# Patient Record
Sex: FEMALE | Race: WHITE | NOT HISPANIC OR LATINO | URBAN - METROPOLITAN AREA
[De-identification: names, ages, dates, MRNs, and addresses within clinical notes are randomized per-mention and may not be internally consistent; named-entity substitution may affect disease eponyms.]

---

## 2018-02-14 ENCOUNTER — OFFICE VISIT (OUTPATIENT)
Dept: INTERNAL MEDICINE | Facility: CLINIC | Age: 38
End: 2018-02-14

## 2018-02-14 VITALS
WEIGHT: 137 LBS | SYSTOLIC BLOOD PRESSURE: 120 MMHG | DIASTOLIC BLOOD PRESSURE: 84 MMHG | OXYGEN SATURATION: 98 % | BODY MASS INDEX: 22.82 KG/M2 | HEART RATE: 85 BPM | HEIGHT: 65 IN

## 2018-02-14 DIAGNOSIS — J10.1 INFLUENZA A: Primary | ICD-10-CM

## 2018-02-14 DIAGNOSIS — J02.9 SORE THROAT: ICD-10-CM

## 2018-02-14 LAB
EXPIRATION DATE: NORMAL
EXPIRATION DATE: NORMAL
FLUAV AG NPH QL: NORMAL
FLUBV AG NPH QL: NORMAL
INTERNAL CONTROL: NORMAL
INTERNAL CONTROL: NORMAL
Lab: NORMAL
Lab: NORMAL
S PYO AG THROAT QL: NEGATIVE

## 2018-02-14 PROCEDURE — 87880 STREP A ASSAY W/OPTIC: CPT | Performed by: INTERNAL MEDICINE

## 2018-02-14 PROCEDURE — 99213 OFFICE O/P EST LOW 20 MIN: CPT | Performed by: INTERNAL MEDICINE

## 2018-02-14 PROCEDURE — 87804 INFLUENZA ASSAY W/OPTIC: CPT | Performed by: INTERNAL MEDICINE

## 2018-02-14 RX ORDER — OSELTAMIVIR PHOSPHATE 75 MG/1
75 CAPSULE ORAL 2 TIMES DAILY
Qty: 10 CAPSULE | Refills: 0 | Status: SHIPPED | OUTPATIENT
Start: 2018-02-14 | End: 2018-07-02

## 2018-02-14 NOTE — PROGRESS NOTES
"Subjective     Salina Yee is a 38 y.o. female who presents with   Chief Complaint   Patient presents with   • Sore Throat   • Cough       History of Present Illness     Sore throat for the past 2 days.  Severe sore throat.  Mild cold.  Some head and nasal congestion.  No fever.  No achiness.      Review of Systems   Respiratory: Positive for cough. Negative for shortness of breath.    Cardiovascular: Negative for chest pain.       The following portions of the patient's history were reviewed and updated as appropriate: allergies, current medications and problem list.    Patient Active Problem List    Diagnosis Date Noted   • HLD (hyperlipidemia) 05/25/2016       Current Outpatient Prescriptions on File Prior to Visit   Medication Sig Dispense Refill   • TRI-LO-SPRINTEC 0.18/0.215/0.25 MG-25 MCG per tablet        No current facility-administered medications on file prior to visit.        Objective     /84  Pulse 85  Ht 165.1 cm (65\")  Wt 62.1 kg (137 lb)  SpO2 98%  BMI 22.8 kg/m2    Physical Exam   Constitutional: She is oriented to person, place, and time. She appears well-developed and well-nourished.   HENT:   Head: Normocephalic and atraumatic.   Right Ear: Hearing and tympanic membrane normal.   Left Ear: Hearing and tympanic membrane normal.   Mouth/Throat: No oropharyngeal exudate or posterior oropharyngeal erythema.   Cardiovascular: Normal rate, regular rhythm and normal heart sounds.    Pulmonary/Chest: Effort normal and breath sounds normal.   Neurological: She is alert and oriented to person, place, and time.   Skin: Skin is warm and dry.   Psychiatric: She has a normal mood and affect. Her behavior is normal.       Assessment/Plan   Salina was seen today for sore throat and cough.    Diagnoses and all orders for this visit:    Influenza A    Sore throat  -     POC Rapid Strep A  -     Throat / Upper Respiratory Culture - Swab, Throat  -     POC Influenza A / B    Other orders  -     " oseltamivir (TAMIFLU) 75 MG capsule; Take 1 capsule by mouth 2 (Two) Times a Day.        Discussion  Patient presents with an acute respiratory illness c/w the flu.  [facility].  A prescription for Tamiflu is provided.  Increase rest and fluids.  Warned of pneumonia complication.  The patient will let me know if not feeling better over the next several days.             No future appointments.

## 2018-06-26 DIAGNOSIS — Z00.00 HEALTH CARE MAINTENANCE: Primary | ICD-10-CM

## 2018-06-30 LAB
25(OH)D3+25(OH)D2 SERPL-MCNC: 56.3 NG/ML (ref 30–100)
ALBUMIN SERPL-MCNC: 4.3 G/DL (ref 3.5–5.2)
ALBUMIN/GLOB SERPL: 1.7 G/DL
ALP SERPL-CCNC: 48 U/L (ref 39–117)
ALT SERPL-CCNC: 23 U/L (ref 1–33)
APPEARANCE UR: CLEAR
AST SERPL-CCNC: 21 U/L (ref 1–32)
BACTERIA #/AREA URNS HPF: ABNORMAL /HPF
BASOPHILS # BLD AUTO: 0.04 10*3/MM3 (ref 0–0.2)
BASOPHILS NFR BLD AUTO: 0.4 % (ref 0–1.5)
BILIRUB SERPL-MCNC: 0.3 MG/DL (ref 0.1–1.2)
BILIRUB UR QL STRIP: NEGATIVE
BUN SERPL-MCNC: 14 MG/DL (ref 6–20)
BUN/CREAT SERPL: 16.9 (ref 7–25)
CALCIUM SERPL-MCNC: 9.1 MG/DL (ref 8.6–10.5)
CHLORIDE SERPL-SCNC: 103 MMOL/L (ref 98–107)
CHOLEST SERPL-MCNC: 205 MG/DL (ref 0–200)
CO2 SERPL-SCNC: 23.7 MMOL/L (ref 22–29)
COLOR UR: YELLOW
CREAT SERPL-MCNC: 0.83 MG/DL (ref 0.57–1)
EOSINOPHIL # BLD AUTO: 0.2 10*3/MM3 (ref 0–0.7)
EOSINOPHIL NFR BLD AUTO: 1.8 % (ref 0.3–6.2)
EPI CELLS #/AREA URNS HPF: ABNORMAL /HPF
ERYTHROCYTE [DISTWIDTH] IN BLOOD BY AUTOMATED COUNT: 13 % (ref 11.7–13)
GLOBULIN SER CALC-MCNC: 2.5 GM/DL
GLUCOSE SERPL-MCNC: 93 MG/DL (ref 65–99)
GLUCOSE UR QL: NEGATIVE
HCT VFR BLD AUTO: 40.4 % (ref 35.6–45.5)
HDLC SERPL-MCNC: 79 MG/DL (ref 40–60)
HGB BLD-MCNC: 12.9 G/DL (ref 11.9–15.5)
HGB UR QL STRIP: ABNORMAL
IMM GRANULOCYTES # BLD: 0.03 10*3/MM3 (ref 0–0.03)
IMM GRANULOCYTES NFR BLD: 0.3 % (ref 0–0.5)
KETONES UR QL STRIP: NEGATIVE
LDLC SERPL CALC-MCNC: 102 MG/DL (ref 0–100)
LEUKOCYTE ESTERASE UR QL STRIP: NEGATIVE
LYMPHOCYTES # BLD AUTO: 2.29 10*3/MM3 (ref 0.9–4.8)
LYMPHOCYTES NFR BLD AUTO: 20.6 % (ref 19.6–45.3)
MCH RBC QN AUTO: 31.2 PG (ref 26.9–32)
MCHC RBC AUTO-ENTMCNC: 31.9 G/DL (ref 32.4–36.3)
MCV RBC AUTO: 97.8 FL (ref 80.5–98.2)
MICRO URNS: ABNORMAL
MONOCYTES # BLD AUTO: 0.58 10*3/MM3 (ref 0.2–1.2)
MONOCYTES NFR BLD AUTO: 5.2 % (ref 5–12)
MUCOUS THREADS URNS QL MICRO: PRESENT /HPF
NEUTROPHILS # BLD AUTO: 7.95 10*3/MM3 (ref 1.9–8.1)
NEUTROPHILS NFR BLD AUTO: 71.7 % (ref 42.7–76)
NITRITE UR QL STRIP: NEGATIVE
PH UR STRIP: 5.5 [PH] (ref 5–7.5)
PLATELET # BLD AUTO: 305 10*3/MM3 (ref 140–500)
POTASSIUM SERPL-SCNC: 4.5 MMOL/L (ref 3.5–5.2)
PROT SERPL-MCNC: 6.8 G/DL (ref 6–8.5)
PROT UR QL STRIP: NEGATIVE
RBC # BLD AUTO: 4.13 10*6/MM3 (ref 3.9–5.2)
RBC #/AREA URNS HPF: ABNORMAL /HPF
SODIUM SERPL-SCNC: 141 MMOL/L (ref 136–145)
SP GR UR: 1.01 (ref 1–1.03)
TRIGL SERPL-MCNC: 118 MG/DL (ref 0–150)
TSH SERPL DL<=0.005 MIU/L-ACNC: 1.46 MIU/ML (ref 0.27–4.2)
URINALYSIS REFLEX: ABNORMAL
UROBILINOGEN UR STRIP-MCNC: 0.2 MG/DL (ref 0.2–1)
VLDLC SERPL CALC-MCNC: 23.6 MG/DL (ref 5–40)
WBC # BLD AUTO: 11.09 10*3/MM3 (ref 4.5–10.7)
WBC #/AREA URNS HPF: ABNORMAL /HPF

## 2018-07-02 ENCOUNTER — OFFICE VISIT (OUTPATIENT)
Dept: INTERNAL MEDICINE | Facility: CLINIC | Age: 38
End: 2018-07-02

## 2018-07-02 VITALS
HEIGHT: 65 IN | BODY MASS INDEX: 21.99 KG/M2 | HEART RATE: 80 BPM | WEIGHT: 132 LBS | SYSTOLIC BLOOD PRESSURE: 122 MMHG | DIASTOLIC BLOOD PRESSURE: 70 MMHG | OXYGEN SATURATION: 99 %

## 2018-07-02 DIAGNOSIS — Z00.00 WELL ADULT EXAM: Primary | ICD-10-CM

## 2018-07-02 PROCEDURE — 90632 HEPA VACCINE ADULT IM: CPT | Performed by: INTERNAL MEDICINE

## 2018-07-02 PROCEDURE — 90471 IMMUNIZATION ADMIN: CPT | Performed by: INTERNAL MEDICINE

## 2018-07-02 PROCEDURE — 99395 PREV VISIT EST AGE 18-39: CPT | Performed by: INTERNAL MEDICINE

## 2018-07-02 NOTE — PROGRESS NOTES
Subjective     Salina DANIELS is a 38 y.o. female who presents for a complete physical exam.      History of Present Illness     HLD.  Mild increase in cholesterol but ratios are good.     Review of Systems   Constitutional: Negative.    HENT: Negative.    Eyes: Negative.    Respiratory: Negative.    Cardiovascular: Negative.    Gastrointestinal: Negative.    Endocrine: Negative.    Genitourinary: Negative.    Musculoskeletal: Negative.    Skin: Negative.    Allergic/Immunologic: Negative.    Neurological: Negative.    Hematological: Negative.    Psychiatric/Behavioral: Negative.        The following portions of the patient's history were reviewed and updated as appropriate: allergies, current medications, past family history, past medical history, past social history, past surgical history and problem list.  Health maintenance tab was reviewed and updated with the patient.       Patient Active Problem List    Diagnosis Date Noted   • HLD (hyperlipidemia) 05/25/2016       Past Medical History:   Diagnosis Date   • Hyperlipidemia        Past Surgical History:   Procedure Laterality Date   • CERVICAL BIOPSY  W/ LOOP ELECTRODE EXCISION         Family History   Problem Relation Age of Onset   • Stroke Paternal Grandmother        Social History     Social History   • Marital status: Single     Spouse name: N/A   • Number of children: N/A   • Years of education: N/A     Occupational History   • Not on file.     Social History Main Topics   • Smoking status: Current Every Day Smoker   • Smokeless tobacco: Not on file   • Alcohol use Yes      Comment: Social   • Drug use: Unknown   • Sexual activity: Not on file     Other Topics Concern   • Not on file     Social History Narrative   • No narrative on file       Current Outpatient Prescriptions on File Prior to Visit   Medication Sig Dispense Refill   • TRI-LO-SPRINTEC 0.18/0.215/0.25 MG-25 MCG per tablet      • [DISCONTINUED] oseltamivir (TAMIFLU) 75 MG capsule Take 1  "capsule by mouth 2 (Two) Times a Day. 10 capsule 0     No current facility-administered medications on file prior to visit.        No Known Allergies    Immunization History   Administered Date(s) Administered   • Influenza TIV (IM) 12/08/2014       Objective     /70   Pulse 80   Ht 165.1 cm (65\")   Wt 59.9 kg (132 lb)   SpO2 99%   BMI 21.97 kg/m²     Physical Exam   Constitutional: She is oriented to person, place, and time. She appears well-developed and well-nourished.   HENT:   Head: Normocephalic and atraumatic.   Right Ear: Hearing, tympanic membrane and external ear normal.   Left Ear: Hearing, tympanic membrane and external ear normal.   Nose: Nose normal.   Mouth/Throat: Oropharynx is clear and moist.   Neck: Neck supple. No thyromegaly present.   Cardiovascular: Normal rate, regular rhythm and normal heart sounds.    No murmur heard.  Pulmonary/Chest: Effort normal and breath sounds normal. Right breast exhibits no mass. Left breast exhibits no mass.   Abdominal: Soft. She exhibits no distension. There is no hepatosplenomegaly. There is no tenderness.   Genitourinary: No breast tenderness.   Lymphadenopathy:     She has no cervical adenopathy.   Neurological: She is alert and oriented to person, place, and time.   Skin: Skin is warm and dry.   Psychiatric: She has a normal mood and affect. Her speech is normal and behavior is normal. Judgment and thought content normal. Cognition and memory are normal.       Assessment/Plan   Salina was seen today for annual exam.    Diagnoses and all orders for this visit:    Well adult exam    Other orders  -     Hepatitis A Vaccine Adult IM        Discussion    Patient presents today for a CPE.      Patient follows a healthy diet.   Patient follows an adequate exercise regimen. Mammogram is not indicated. Colonoscopy is not yet indicated.   Pap smears are performed by the patient's gynecologist.   Immunizations are as per orders.  She only smokes 1-2 " cigarettes a week but we did discuss that no amount of tobacco is safe and she is going to quit.      Health Maintenance   Topic Date Due   • ANNUAL PHYSICAL  02/13/1985   • PNEUMOCOCCAL VACCINE (19-64 MEDIUM RISK) (1 of 1 - PPSV23) 02/13/2001   • TDAP/TD VACCINES (1 - Tdap) 02/13/2001   • PAP SMEAR  05/25/2016   • INFLUENZA VACCINE  08/01/2018   • LIPID PANEL  06/29/2019            No future appointments.

## 2018-11-20 ENCOUNTER — APPOINTMENT (OUTPATIENT)
Dept: WOMENS IMAGING | Facility: HOSPITAL | Age: 38
End: 2018-11-20

## 2018-11-20 PROCEDURE — 77065 DX MAMMO INCL CAD UNI: CPT | Performed by: RADIOLOGY

## 2018-11-20 PROCEDURE — G0279 TOMOSYNTHESIS, MAMMO: HCPCS | Performed by: RADIOLOGY

## 2018-11-20 PROCEDURE — 76641 ULTRASOUND BREAST COMPLETE: CPT | Performed by: RADIOLOGY

## 2018-11-20 PROCEDURE — 77061 BREAST TOMOSYNTHESIS UNI: CPT | Performed by: RADIOLOGY

## 2018-11-20 PROCEDURE — MDREVSPNEW: Performed by: RADIOLOGY

## 2019-02-01 ENCOUNTER — OFFICE VISIT (OUTPATIENT)
Dept: MAMMOGRAPHY | Facility: CLINIC | Age: 39
End: 2019-02-01

## 2019-02-01 VITALS
OXYGEN SATURATION: 97 % | WEIGHT: 133 LBS | DIASTOLIC BLOOD PRESSURE: 60 MMHG | TEMPERATURE: 98.2 F | BODY MASS INDEX: 22.16 KG/M2 | HEART RATE: 71 BPM | SYSTOLIC BLOOD PRESSURE: 100 MMHG | HEIGHT: 65 IN

## 2019-02-01 DIAGNOSIS — N63.0 LUMP OR MASS IN BREAST: Primary | ICD-10-CM

## 2019-02-01 PROCEDURE — 99203 OFFICE O/P NEW LOW 30 MIN: CPT | Performed by: SURGERY

## 2019-12-18 ENCOUNTER — OFFICE VISIT (OUTPATIENT)
Dept: INTERNAL MEDICINE | Facility: CLINIC | Age: 39
End: 2019-12-18

## 2019-12-18 VITALS
WEIGHT: 135 LBS | HEIGHT: 65 IN | OXYGEN SATURATION: 98 % | RESPIRATION RATE: 18 BRPM | BODY MASS INDEX: 22.49 KG/M2 | HEART RATE: 86 BPM | TEMPERATURE: 98.2 F | SYSTOLIC BLOOD PRESSURE: 122 MMHG | DIASTOLIC BLOOD PRESSURE: 70 MMHG

## 2019-12-18 DIAGNOSIS — J01.90 ACUTE RHINOSINUSITIS: Primary | ICD-10-CM

## 2019-12-18 PROCEDURE — 99213 OFFICE O/P EST LOW 20 MIN: CPT | Performed by: NURSE PRACTITIONER

## 2019-12-18 RX ORDER — CEFUROXIME AXETIL 250 MG/1
250 TABLET ORAL 2 TIMES DAILY
Qty: 20 TABLET | Refills: 0 | Status: SHIPPED | OUTPATIENT
Start: 2019-12-18 | End: 2023-01-13

## 2019-12-18 RX ORDER — BENZONATATE 100 MG/1
100 CAPSULE ORAL 3 TIMES DAILY PRN
Qty: 30 CAPSULE | Refills: 1 | Status: SHIPPED | OUTPATIENT
Start: 2019-12-18 | End: 2023-01-13

## 2019-12-18 NOTE — PROGRESS NOTES
Subjective   Salina DANIELS is a 39 y.o. female.   Chief Complaint   Patient presents with   • Sinus Pressure   • Cough     Vitals:    12/18/19 1308   BP: 122/70   Pulse: 86   Resp: 18   Temp: 98.2 °F (36.8 °C)   SpO2: 98%     No LMP recorded.    Salina is a patient of Dr philip who is here for an acute visit     Sinus Problem   This is a new problem. The current episode started 1 to 4 weeks ago. The problem has been gradually worsening since onset. There has been no fever. The pain is mild. Associated symptoms include congestion, coughing, ear pain, headaches, sinus pressure and a sore throat. Pertinent negatives include no shortness of breath or sneezing. Past treatments include acetaminophen (mucinex ). The treatment provided no relief.        The following portions of the patient's history were reviewed and updated as appropriate: allergies, current medications, past family history, past medical history, past social history, past surgical history and problem list.    Review of Systems   Constitutional: Positive for fatigue. Negative for fever.   HENT: Positive for congestion, ear pain, sinus pressure and sore throat. Negative for sneezing.    Respiratory: Positive for cough. Negative for chest tightness and shortness of breath.    Cardiovascular: Negative.    Neurological: Positive for headaches.       Objective   Physical Exam   Constitutional: Vital signs are normal. She appears well-developed and well-nourished. No distress.   HENT:   Head: Normocephalic.   Right Ear: Ear canal normal. A middle ear effusion is present.   Left Ear: Ear canal normal. A middle ear effusion is present.   Nose: Mucosal edema and rhinorrhea present. Right sinus exhibits no maxillary sinus tenderness and no frontal sinus tenderness. Left sinus exhibits no maxillary sinus tenderness and no frontal sinus tenderness.   Mouth/Throat: Uvula is midline. Posterior oropharyngeal erythema present.   Cardiovascular: Normal rate, regular  rhythm and normal heart sounds.   Pulmonary/Chest: Effort normal and breath sounds normal.   Neurological: She is alert.   Skin: Skin is warm, dry and intact.       Assessment/Plan   Salina was seen today for sinus pressure and cough.    Diagnoses and all orders for this visit:    Acute rhinosinusitis    Other orders  -     benzonatate (TESSALON PERLES) 100 MG capsule; Take 1 capsule by mouth 3 (Three) Times a Day As Needed for Cough.  -     cefuroxime (CEFTIN) 250 MG tablet; Take 1 tablet by mouth 2 (Two) Times a Day.      She has not improved with otc medication  Start ceftin  Rest and drink plenty of fluids  Ok to continue mucinex  Follow up if your symptoms persist, worsen or if new symptoms

## 2020-02-26 ENCOUNTER — APPOINTMENT (OUTPATIENT)
Dept: WOMENS IMAGING | Facility: HOSPITAL | Age: 40
End: 2020-02-26

## 2020-02-26 PROCEDURE — 77062 BREAST TOMOSYNTHESIS BI: CPT | Performed by: RADIOLOGY

## 2020-02-26 PROCEDURE — 76641 ULTRASOUND BREAST COMPLETE: CPT | Performed by: RADIOLOGY

## 2020-02-26 PROCEDURE — 77066 DX MAMMO INCL CAD BI: CPT | Performed by: RADIOLOGY

## 2020-02-26 PROCEDURE — G0279 TOMOSYNTHESIS, MAMMO: HCPCS | Performed by: RADIOLOGY

## 2020-03-19 DIAGNOSIS — Z00.00 HEALTH CARE MAINTENANCE: Primary | ICD-10-CM

## 2020-03-20 DIAGNOSIS — Z00.00 HEALTH CARE MAINTENANCE: Primary | ICD-10-CM

## 2020-03-20 LAB
ALBUMIN SERPL-MCNC: 4.1 G/DL (ref 3.5–5.2)
ALBUMIN/GLOB SERPL: 1.5 G/DL
ALP SERPL-CCNC: 66 U/L (ref 39–117)
ALT SERPL-CCNC: 26 U/L (ref 1–33)
APPEARANCE UR: CLEAR
AST SERPL-CCNC: 24 U/L (ref 1–32)
BACTERIA #/AREA URNS HPF: ABNORMAL /HPF
BASOPHILS # BLD AUTO: 0.07 10*3/MM3 (ref 0–0.2)
BASOPHILS NFR BLD AUTO: 1 % (ref 0–1.5)
BILIRUB SERPL-MCNC: 0.4 MG/DL (ref 0.2–1.2)
BILIRUB UR QL STRIP: NEGATIVE
BUN SERPL-MCNC: 11 MG/DL (ref 6–20)
BUN/CREAT SERPL: 13.1 (ref 7–25)
CALCIUM SERPL-MCNC: 9.9 MG/DL (ref 8.6–10.5)
CASTS URNS MICRO: ABNORMAL
CHLORIDE SERPL-SCNC: 101 MMOL/L (ref 98–107)
CHOLEST SERPL-MCNC: 207 MG/DL (ref 0–200)
CO2 SERPL-SCNC: 25 MMOL/L (ref 22–29)
COLOR UR: YELLOW
CREAT SERPL-MCNC: 0.84 MG/DL (ref 0.57–1)
EOSINOPHIL # BLD AUTO: 0.36 10*3/MM3 (ref 0–0.4)
EOSINOPHIL NFR BLD AUTO: 5.1 % (ref 0.3–6.2)
EPI CELLS #/AREA URNS HPF: ABNORMAL /HPF
ERYTHROCYTE [DISTWIDTH] IN BLOOD BY AUTOMATED COUNT: 12.1 % (ref 12.3–15.4)
GLOBULIN SER CALC-MCNC: 2.7 GM/DL
GLUCOSE SERPL-MCNC: 96 MG/DL (ref 65–99)
GLUCOSE UR QL: NEGATIVE
HCT VFR BLD AUTO: 40.1 % (ref 34–46.6)
HDLC SERPL-MCNC: 78 MG/DL (ref 40–60)
HGB BLD-MCNC: 13.8 G/DL (ref 12–15.9)
HGB UR QL STRIP: NEGATIVE
IMM GRANULOCYTES # BLD AUTO: 0.01 10*3/MM3 (ref 0–0.05)
IMM GRANULOCYTES NFR BLD AUTO: 0.1 % (ref 0–0.5)
KETONES UR QL STRIP: NEGATIVE
LDLC SERPL CALC-MCNC: 99 MG/DL (ref 0–100)
LEUKOCYTE ESTERASE UR QL STRIP: NEGATIVE
LYMPHOCYTES # BLD AUTO: 2.65 10*3/MM3 (ref 0.7–3.1)
LYMPHOCYTES NFR BLD AUTO: 37.5 % (ref 19.6–45.3)
MCH RBC QN AUTO: 31.9 PG (ref 26.6–33)
MCHC RBC AUTO-ENTMCNC: 34.4 G/DL (ref 31.5–35.7)
MCV RBC AUTO: 92.8 FL (ref 79–97)
MONOCYTES # BLD AUTO: 0.42 10*3/MM3 (ref 0.1–0.9)
MONOCYTES NFR BLD AUTO: 5.9 % (ref 5–12)
NEUTROPHILS # BLD AUTO: 3.56 10*3/MM3 (ref 1.7–7)
NEUTROPHILS NFR BLD AUTO: 50.4 % (ref 42.7–76)
NITRITE UR QL STRIP: NEGATIVE
NRBC BLD AUTO-RTO: 0 /100 WBC (ref 0–0.2)
PH UR STRIP: 7 [PH] (ref 5–8)
PLATELET # BLD AUTO: 319 10*3/MM3 (ref 140–450)
POTASSIUM SERPL-SCNC: 4.5 MMOL/L (ref 3.5–5.2)
PROT SERPL-MCNC: 6.8 G/DL (ref 6–8.5)
PROT UR QL STRIP: NEGATIVE
RBC # BLD AUTO: 4.32 10*6/MM3 (ref 3.77–5.28)
RBC #/AREA URNS HPF: ABNORMAL /HPF
SODIUM SERPL-SCNC: 138 MMOL/L (ref 136–145)
SP GR UR: 1.02 (ref 1–1.03)
TRIGL SERPL-MCNC: 150 MG/DL (ref 0–150)
TSH SERPL DL<=0.005 MIU/L-ACNC: 1.74 UIU/ML (ref 0.27–4.2)
UROBILINOGEN UR STRIP-MCNC: NORMAL MG/DL
VLDLC SERPL CALC-MCNC: 30 MG/DL
WBC # BLD AUTO: 7.07 10*3/MM3 (ref 3.4–10.8)
WBC #/AREA URNS HPF: ABNORMAL /HPF

## 2021-04-16 ENCOUNTER — BULK ORDERING (OUTPATIENT)
Dept: CASE MANAGEMENT | Facility: OTHER | Age: 41
End: 2021-04-16

## 2021-04-16 DIAGNOSIS — Z23 IMMUNIZATION DUE: ICD-10-CM

## 2022-03-21 ENCOUNTER — APPOINTMENT (OUTPATIENT)
Dept: WOMENS IMAGING | Facility: HOSPITAL | Age: 42
End: 2022-03-21

## 2022-03-21 PROCEDURE — 77063 BREAST TOMOSYNTHESIS BI: CPT | Performed by: RADIOLOGY

## 2022-03-21 PROCEDURE — 77067 SCR MAMMO BI INCL CAD: CPT | Performed by: RADIOLOGY

## 2023-01-13 ENCOUNTER — OFFICE VISIT (OUTPATIENT)
Dept: INTERNAL MEDICINE | Facility: CLINIC | Age: 43
End: 2023-01-13
Payer: COMMERCIAL

## 2023-01-13 VITALS
OXYGEN SATURATION: 98 % | TEMPERATURE: 98.6 F | SYSTOLIC BLOOD PRESSURE: 118 MMHG | HEIGHT: 65 IN | BODY MASS INDEX: 20.83 KG/M2 | WEIGHT: 125 LBS | HEART RATE: 72 BPM | DIASTOLIC BLOOD PRESSURE: 68 MMHG

## 2023-01-13 DIAGNOSIS — Z00.00 WELL ADULT EXAM: Primary | ICD-10-CM

## 2023-01-13 DIAGNOSIS — Z00.00 LABORATORY TESTS ORDERED AS PART OF A COMPLETE PHYSICAL EXAM (CPE): ICD-10-CM

## 2023-01-13 PROCEDURE — 99386 PREV VISIT NEW AGE 40-64: CPT | Performed by: INTERNAL MEDICINE

## 2023-01-13 NOTE — PROGRESS NOTES
Subjective     Salina Arroyo is a 42 y.o. female who presents for a complete physical exam.      History of Present Illness     She is due for labs.  No specific issues.     Review of Systems   Constitutional: Negative.    HENT: Negative.    Eyes: Negative.    Respiratory: Negative.    Cardiovascular: Negative.    Gastrointestinal: Negative.    Endocrine: Negative.    Genitourinary: Negative.    Musculoskeletal: Negative.    Skin: Negative.    Allergic/Immunologic: Negative.    Neurological: Negative.    Hematological: Negative.    Psychiatric/Behavioral: Negative.        The following portions of the patient's history were reviewed and updated as appropriate: allergies, current medications, past family history, past medical history, past social history, past surgical history and problem list.  Health maintenance tab was reviewed and updated with the patient.       Patient Active Problem List    Diagnosis Date Noted   • HLD (hyperlipidemia) 05/25/2016       Past Medical History:   Diagnosis Date   • Hyperlipidemia        Past Surgical History:   Procedure Laterality Date   • CERVICAL BIOPSY  W/ LOOP ELECTRODE EXCISION     • FOOT SURGERY Right        Family History   Problem Relation Age of Onset   • Stroke Paternal Grandmother    • Hyperlipidemia Paternal Grandmother    • Stroke Maternal Grandmother    • Hyperlipidemia Maternal Grandmother        Social History     Socioeconomic History   • Marital status:    Tobacco Use   • Smoking status: Former     Packs/day: 0.00     Years: 15.00     Pack years: 0.00     Types: Cigarettes   • Smokeless tobacco: Never   • Tobacco comments:     Social smoking rarely   Substance and Sexual Activity   • Alcohol use: Yes     Alcohol/week: 2.0 standard drinks     Types: 2 Glasses of wine per week     Comment: Social   • Drug use: Never   • Sexual activity: Yes     Partners: Male     Birth control/protection: Pill       Current Outpatient Medications on File Prior to Visit  "  Medication Sig Dispense Refill   • TRI-LO-SPRINTEC 0.18/0.215/0.25 MG-25 MCG per tablet      • [DISCONTINUED] benzonatate (TESSALON PERLES) 100 MG capsule Take 1 capsule by mouth 3 (Three) Times a Day As Needed for Cough. 30 capsule 1   • [DISCONTINUED] cefuroxime (CEFTIN) 250 MG tablet Take 1 tablet by mouth 2 (Two) Times a Day. 20 tablet 0     No current facility-administered medications on file prior to visit.       No Known Allergies    Immunization History   Administered Date(s) Administered   • COVID-19 (PFIZER) BIVALENT BOOSTER 12+YRS 11/28/2022   • COVID-19 (PFIZER) PURPLE CAP 03/12/2021, 04/02/2021   • Hepatitis A 07/02/2018   • Influenza TIV (IM) 12/08/2014   • Influenza, Unspecified 11/19/2018, 11/28/2022       Objective     /68 (BP Location: Right arm, Patient Position: Sitting, Cuff Size: Adult)   Pulse 72   Temp 98.6 °F (37 °C) (Oral)   Ht 165.1 cm (65\")   Wt 56.7 kg (125 lb)   LMP 12/13/2022 (Approximate)   SpO2 98%   BMI 20.80 kg/m²     Physical Exam  Constitutional:       Appearance: She is well-developed.   HENT:      Head: Normocephalic and atraumatic.      Right Ear: Hearing, tympanic membrane and external ear normal.      Left Ear: Hearing, tympanic membrane and external ear normal.      Nose: Nose normal.   Neck:      Thyroid: No thyromegaly.   Cardiovascular:      Rate and Rhythm: Normal rate and regular rhythm.      Heart sounds: Normal heart sounds. No murmur heard.  Pulmonary:      Effort: Pulmonary effort is normal.      Breath sounds: Normal breath sounds.   Abdominal:      General: There is no distension.      Palpations: Abdomen is soft.      Tenderness: There is no abdominal tenderness.   Musculoskeletal:      Cervical back: Neck supple.   Lymphadenopathy:      Cervical: No cervical adenopathy.   Skin:     General: Skin is warm and dry.   Neurological:      Mental Status: She is alert and oriented to person, place, and time.   Psychiatric:         Speech: Speech normal.  "        Behavior: Behavior normal.         Thought Content: Thought content normal.         Judgment: Judgment normal.         Assessment & Plan   Diagnoses and all orders for this visit:    1. Well adult exam (Primary)    2. Laboratory tests ordered as part of a complete physical exam (CPE)  -     CBC & Differential  -     Comprehensive Metabolic Panel  -     TSH Rfx On Abnormal To Free T4  -     Urinalysis With Microscopic - Urine, Clean Catch  -     Lipid Panel  -     Hepatitis C Antibody        Discussion    Patient presents today for a CPE.      Patient follows a healthy diet.   Patient follows an adequate exercise regimen. Colonoscopy is not yet indicated.   Pap smears and mammogram are performed by the patient's gynecologist.  I have recommended that the patient get the following immunizations:  tdap.      Health Maintenance   Topic Date Due   • TDAP/TD VACCINES (1 - Tdap) Never done   • HEPATITIS C SCREENING  Never done   • PAP SMEAR  Never done   • ANNUAL PHYSICAL  07/02/2019   • LIPID PANEL  03/20/2021   • COVID-19 Vaccine  Completed   • INFLUENZA VACCINE  Completed   • Pneumococcal Vaccine 0-64  Aged Out            Future Appointments   Date Time Provider Department Center   1/17/2023 10:00 AM [facility]   1/12/2024  9:30 AM [facility]   1/19/2024  3:30 PM Mitra Aguilar MD [facility]

## 2023-01-17 DIAGNOSIS — Z00.00 HEALTH MAINTENANCE EXAMINATION: Primary | ICD-10-CM

## 2023-01-18 LAB
ALBUMIN SERPL-MCNC: 4.5 G/DL (ref 3.5–5.2)
ALBUMIN/GLOB SERPL: 1.8 G/DL
ALP SERPL-CCNC: 56 U/L (ref 39–117)
ALT SERPL-CCNC: 22 U/L (ref 1–33)
APPEARANCE UR: CLEAR
AST SERPL-CCNC: 22 U/L (ref 1–32)
BACTERIA #/AREA URNS HPF: NORMAL /HPF
BASOPHILS # BLD AUTO: 0.06 10*3/MM3 (ref 0–0.2)
BASOPHILS NFR BLD AUTO: 0.9 % (ref 0–1.5)
BILIRUB SERPL-MCNC: 0.2 MG/DL (ref 0–1.2)
BILIRUB UR QL STRIP: NEGATIVE
BUN SERPL-MCNC: 18 MG/DL (ref 6–20)
BUN/CREAT SERPL: 21.2 (ref 7–25)
CALCIUM SERPL-MCNC: 9.5 MG/DL (ref 8.6–10.5)
CASTS URNS MICRO: NORMAL
CHLORIDE SERPL-SCNC: 101 MMOL/L (ref 98–107)
CHOLEST SERPL-MCNC: 242 MG/DL (ref 0–200)
CO2 SERPL-SCNC: 26.7 MMOL/L (ref 22–29)
COLOR UR: YELLOW
CREAT SERPL-MCNC: 0.85 MG/DL (ref 0.57–1)
EGFRCR SERPLBLD CKD-EPI 2021: 88.9 ML/MIN/1.73
EOSINOPHIL # BLD AUTO: 0.15 10*3/MM3 (ref 0–0.4)
EOSINOPHIL NFR BLD AUTO: 2.3 % (ref 0.3–6.2)
EPI CELLS #/AREA URNS HPF: NORMAL /HPF
ERYTHROCYTE [DISTWIDTH] IN BLOOD BY AUTOMATED COUNT: 11.8 % (ref 12.3–15.4)
GLOBULIN SER CALC-MCNC: 2.5 GM/DL
GLUCOSE SERPL-MCNC: 95 MG/DL (ref 65–99)
GLUCOSE UR QL STRIP: NEGATIVE
HCT VFR BLD AUTO: 40.3 % (ref 34–46.6)
HCV AB S/CO SERPL IA: <0.1 S/CO RATIO (ref 0–0.9)
HDLC SERPL-MCNC: 83 MG/DL (ref 40–60)
HGB BLD-MCNC: 13.5 G/DL (ref 12–15.9)
HGB UR QL STRIP: ABNORMAL
IMM GRANULOCYTES # BLD AUTO: 0.02 10*3/MM3 (ref 0–0.05)
IMM GRANULOCYTES NFR BLD AUTO: 0.3 % (ref 0–0.5)
KETONES UR QL STRIP: NEGATIVE
LDLC SERPL CALC-MCNC: 131 MG/DL (ref 0–100)
LEUKOCYTE ESTERASE UR QL STRIP: NEGATIVE
LYMPHOCYTES # BLD AUTO: 1.78 10*3/MM3 (ref 0.7–3.1)
LYMPHOCYTES NFR BLD AUTO: 26.8 % (ref 19.6–45.3)
MCH RBC QN AUTO: 31.8 PG (ref 26.6–33)
MCHC RBC AUTO-ENTMCNC: 33.5 G/DL (ref 31.5–35.7)
MCV RBC AUTO: 95 FL (ref 79–97)
MONOCYTES # BLD AUTO: 0.37 10*3/MM3 (ref 0.1–0.9)
MONOCYTES NFR BLD AUTO: 5.6 % (ref 5–12)
NEUTROPHILS # BLD AUTO: 4.25 10*3/MM3 (ref 1.7–7)
NEUTROPHILS NFR BLD AUTO: 64.1 % (ref 42.7–76)
NITRITE UR QL STRIP: NEGATIVE
NRBC BLD AUTO-RTO: 0 /100 WBC (ref 0–0.2)
PH UR STRIP: 5.5 [PH] (ref 5–8)
PLATELET # BLD AUTO: 327 10*3/MM3 (ref 140–450)
POTASSIUM SERPL-SCNC: 4.7 MMOL/L (ref 3.5–5.2)
PROT SERPL-MCNC: 7 G/DL (ref 6–8.5)
PROT UR QL STRIP: NEGATIVE
RBC # BLD AUTO: 4.24 10*6/MM3 (ref 3.77–5.28)
RBC #/AREA URNS HPF: NORMAL /HPF
SODIUM SERPL-SCNC: 135 MMOL/L (ref 136–145)
SP GR UR STRIP: 1.02 (ref 1–1.03)
TRIGL SERPL-MCNC: 162 MG/DL (ref 0–150)
TSH SERPL DL<=0.005 MIU/L-ACNC: 1.25 UIU/ML (ref 0.27–4.2)
UROBILINOGEN UR STRIP-MCNC: ABNORMAL MG/DL
VLDLC SERPL CALC-MCNC: 28 MG/DL (ref 5–40)
WBC # BLD AUTO: 6.63 10*3/MM3 (ref 3.4–10.8)
WBC #/AREA URNS HPF: NORMAL /HPF

## 2023-05-12 ENCOUNTER — APPOINTMENT (OUTPATIENT)
Dept: WOMENS IMAGING | Facility: HOSPITAL | Age: 43
End: 2023-05-12
Payer: COMMERCIAL

## 2023-05-12 PROCEDURE — 77067 SCR MAMMO BI INCL CAD: CPT | Performed by: RADIOLOGY

## 2023-05-12 PROCEDURE — 77063 BREAST TOMOSYNTHESIS BI: CPT | Performed by: RADIOLOGY

## 2023-05-17 ENCOUNTER — OFFICE VISIT (OUTPATIENT)
Dept: INTERNAL MEDICINE | Facility: CLINIC | Age: 43
End: 2023-05-17
Payer: COMMERCIAL

## 2023-05-17 VITALS
HEART RATE: 72 BPM | HEIGHT: 65 IN | DIASTOLIC BLOOD PRESSURE: 62 MMHG | BODY MASS INDEX: 21.99 KG/M2 | WEIGHT: 132 LBS | TEMPERATURE: 98.1 F | OXYGEN SATURATION: 98 % | SYSTOLIC BLOOD PRESSURE: 110 MMHG

## 2023-05-17 DIAGNOSIS — J06.9 VIRAL URI: Primary | ICD-10-CM

## 2023-05-17 LAB
EXPIRATION DATE: NORMAL
INTERNAL CONTROL: NORMAL
Lab: NORMAL
S PYO AG THROAT QL: NEGATIVE

## 2023-05-17 PROCEDURE — 87880 STREP A ASSAY W/OPTIC: CPT | Performed by: STUDENT IN AN ORGANIZED HEALTH CARE EDUCATION/TRAINING PROGRAM

## 2023-05-17 PROCEDURE — 99213 OFFICE O/P EST LOW 20 MIN: CPT | Performed by: STUDENT IN AN ORGANIZED HEALTH CARE EDUCATION/TRAINING PROGRAM

## 2023-05-17 NOTE — PROGRESS NOTES
"  Sean Herring D.O.  Internal Medicine  [facility]  [address], [address]  [place] [address]  [phone]      Chief Complaint  Sore Throat and Headache    SUBJECTIVE    History of Present Illness    Salina Arroyo is a 43 y.o. female who presents to the office today as an established patient of Dr Mitra Aguilar MD here today for an acute care visit.     Symptoms started yesterday with a tickle in her throat. Woke up this morning with raging sore throat, headache. She doesn't feel congested. When she does blow her nose it is clear. She has used cough drops for the sore throat. She tested self for COVID at home and was negative. She has some sneezing but no coughing. No fever or chills, no ear ache.     No Known Allergies     Outpatient Medications Marked as Taking for the 5/17/23 encounter (Office Visit) with Sean Herring, DO   Medication Sig Dispense Refill   • TRI-LO-SPRINTEC 0.18/0.215/0.25 MG-25 MCG per tablet           Past Medical History:   Diagnosis Date   • Hyperlipidemia        OBJECTIVE    Vital Signs:   /62   Pulse 72   Temp 98.1 °F (36.7 °C) (Infrared)   Ht 165.1 cm (65\")   Wt 59.9 kg (132 lb)   SpO2 98%   BMI 21.97 kg/m²     Physical Exam  Vitals reviewed.   Constitutional:       General: She is not in acute distress.     Appearance: Normal appearance. She is normal weight. She is not ill-appearing.   HENT:      Head: Normocephalic and atraumatic.      Right Ear: Tympanic membrane, ear canal and external ear normal. There is no impacted cerumen.      Left Ear: Tympanic membrane, ear canal and external ear normal. There is no impacted cerumen.      Mouth/Throat:      Mouth: Mucous membranes are moist.      Pharynx: Oropharynx is clear. Posterior oropharyngeal erythema (slight posterior pharynx) present. No oropharyngeal exudate.   Eyes:      General: No scleral icterus.  Cardiovascular:      Rate and Rhythm: Normal rate and regular rhythm.      Heart sounds: " Normal heart sounds. No murmur heard.  Pulmonary:      Effort: Pulmonary effort is normal.      Breath sounds: Normal breath sounds. No stridor. No wheezing or rhonchi.   Lymphadenopathy:      Cervical: Cervical adenopathy (anterior cervical) present.   Skin:     Coloration: Skin is not jaundiced.   Neurological:      Mental Status: She is alert.   Psychiatric:         Mood and Affect: Mood normal.         Behavior: Behavior normal.         Thought Content: Thought content normal.                             ASSESSMENT & PLAN     Diagnoses and all orders for this visit:    1. Viral URI (Primary)  -Pt presents today with 1 day duration of sore throat and headache.   -Reviewed vital signs and they show  Pt is normotensive, afebrile, satting 98% on room air . Physical exam demonstrates anterior cervical adenopathy and slight posterior pharynx erythema.   -Rapid Strep A test negative  -Advised pt that symptoms are most consistent with viral upper respiratory infection. Symptoms typically peak by day 3-5. Cough can last up to 2 weeks.   -Continue symptomatic management at home with over the counter cold and flu medications as needed(Coricidin HBP if a patient with Hypertension), Tylenol for muscle aches/fever/chills, 6-8 glasses of water daily, sore throat spray OTC  -If feeling better and then begin to get worse or if symptoms persist beyond 10 days without improvement, contact office to consider antibiotic treatment.              The following social determinates of health impact the patient's medical decision making: No social determinates of health were factored in to today's visit.     Follow Up  No follow-ups on file.    Patient/family had no further questions at this time and verbalized understanding of the plan discussed today.

## 2023-08-20 NOTE — PROGRESS NOTES
Chief Complaint: Salina DANEILS is a 38 y.o.. female here today for Breast Mass        History of Present Illness:  Patient presents with breast mass.  She is a nice 39-year-old white female who is was noted on routine examination I have a lump in the subareolar location of the left breast in November 2018.  Imaging studies were performed and on the left side there were 2 small calcifications beneath the palpable area but no mass was detected or any suspicious calcifications.  An ultrasound also failed to detect any abnormalities.  This area was followed and a reexamination 8 weeks later showed persistence of the small BB sized area.  The patient does not feel that the area has actually enlarged any since discovery.  It is not tender and there has been no nipple discharge.  She denies any prior history of breast biopsies and her family history is negative for breast cancer.  I have personally reviewed her mammogram and ultrasound.  The 2 calcifications would appear to be benign and otherwise I do not see any worrisome findings on those studies.      Review of Systems:  Review of Systems   Skin:        The patient denies any noticeable changes to the skin of the breast.    All other systems reviewed and are negative.       Past Medical and Surgical History:  Breast Biopsy History:  Patient has not had a breast biopsy in the past.  Breast Cancer HIstory:  Patient does not have a past medical history of breast cancer.  Breast Operations, and year:  None  Social History     Tobacco Use   Smoking Status Light Tobacco Smoker   Smokeless Tobacco Never Used     Obstetric History:  Patient is premenopausal, first day of last period:Jan. 14, 2019   Number of pregnancies:0  Length of time taking birth control pills:15 years  Patient has never taken hormone replacement    Past Surgical History:   Procedure Laterality Date   • CERVICAL BIOPSY  W/ LOOP ELECTRODE EXCISION     • FOOT SURGERY Right        Past Medical History:  Physician Note     I saw and examined the patient. The patients symptoms, physical findings, and studies, are also as documented by the PA/NP/resident;  I discussed the patients treatment plans with the patient, RN and ER physician.  Data Reviewed by me include see below    Portions of the History, Physical Examination, and MDM Medical Decision Making were performed by the PA/NP listed above and portions of the History, Physical Examination, and MDM Medical Decision Making and complete History, Physical Examination, and MDM Medical Decision Making were done by me.    Past medical history, family history, and social history as well as medications and allergies were reviewed by me.    My Findings include     Chief Complaint:   Chief Complaint   Patient presents with   • Weakness        Interval History / Subjective:   Terry reports that today he was “feeling off. ”  Says he felt feverish.  His bullous pemphigoid has also been worse in the recent past.  His sugars have been higher than usual as well.  Not had any cough or congestion but he has had some rhinorrhea.  He reports he has some “sinus” troubles every summer.  Denies any dysuria, frequency, or hematuria.  He does have chronic urinary incontinence at night.  His bowels have been normal.  No diarrhea or constipation.  He does follow clinically with Dr Acosta.  He says he is had a sore on the right 2nd toe that has been followed.  He notes over the last week or 2 the toe has been much more swollen.  Does not report any significant pain.  No recent trauma.  He reports that a left lower tooth is having some problems    In the ER he was found to be hypoxic and was started on supplemental oxygen.  Labs included glucose 246.  Lactic acid 5.8.  Procalcitonin less than 0.05.  White blood cell count 17.4.  Hemoglobin 9.6 and hematocrit 29.4.  CRP 3.1, platelet count 250153.  Chest x-ray was negative.  Foot imaging revealed progression of his underlying Charcot foot    Diagnosis Date   • Hyperlipidemia        Prior Hospitalizations, other than for surgery or childbirth, and year:  None    Social History:  Patient is .  Patient has no children.    Family History:  Family History   Problem Relation Age of Onset   • Stroke Paternal Grandmother    • Hyperlipidemia Paternal Grandmother    • Stroke Maternal Grandmother    • Hyperlipidemia Maternal Grandmother        Vital Signs:  Vitals:    02/01/19 0803   BP: 100/60   Pulse: 71   Temp: 98.2 °F (36.8 °C)   SpO2: 97%       Medications:    Current Outpatient Prescriptions:     Current Outpatient Medications:   •  TRI-LO-SPRINTEC 0.18/0.215/0.25 MG-25 MCG per tablet, , Disp: , Rfl:     Physical Examination:  General Appearance:   Patient is in no distress.  She is well kept and has an average build.   Psychiatric:  Patient with appropriate mood and affect. Alert and oriented to self, time, and place.    Breast, RIGHT:  small sized, symmetric with the contralateral side.  Breast skin is without erythema, edema, rashes.  There are no visible abnormalities upon inspection during the arm-raising maneuver or with hands on hips in the sitting position. There is no nipple retraction, discharge or nipple/areolar skin changes.There are no masses palpable in the sitting or supine positions.    Breast, LEFT:  small sized, symmetric with the contralateral side.  Breast skin is without erythema, edema, rashes.  There are no visible abnormalities upon inspection during the arm-raising maneuver or with hands on hips in the sitting position. There is no nipple retraction, discharge or nipple/areolar skin changes.There are no masses palpable in the sitting or supine positions.  I carefully palpated the subareolar area where the lump was previously detected.  She does have fibrocystic nodularity but nothing that stands out as a dominant mass.  She does appear to be more nodular on the left side than the right.  The nodularity extends from the 12:00  deformity without evidence of osteomyelitis in the 2nd toe.    He was given fluid boluses to equal 30 cc/kilogram as well as vancomycin and cefepime.  Flagyl was added.  Urinalysis was obtained and was negative for infection.  Patient is being admitted for further evaluation and treatment.    Objective   PHYSICAL EXAMINATION     Vital 24 Hour Range Most Recent Value   Temperature Temp  Min: 98.4 °F (36.9 °C)  Max: 100.5 °F (38.1 °C) 98.4 °F (36.9 °C)   Pulse Pulse  Min: 81  Max: 92 83   Respiratory Resp  Min: 20  Max: 29 (!) 24   Blood Pressure BP  Min: 115/59  Max: 141/67 (!) 141/67   Pulse Oximetry SpO2  Min: 85 %  Max: 95 % 93 %   Arterial BP No data recorded     O2 O2 Flow Rate (L/min)  Avg: 3 L/min  Min: 3 L/min   Min taken time: 08/20/23 1500  Max: 3 L/min   Max taken time: 08/20/23 1500       Recorded Intake and Output:No intake or output data in the 24 hours ending 08/20/23 1548   Recorded Last Stool Occurrence:       Vital Most Recent Value First Value   Weight 88.3 kg (194 lb 10.7 oz) Weight: 88.3 kg (194 lb 10.7 oz)   Height 6' 3\" (190.5 cm) Height: 6' 3\" (190.5 cm)   BMI 24.33 N/A     Telemetry:       General: Well-developed male in no acute distress. Nontoxic.  Pleasant and conversational.  Speaks in full sentences.  HEENT: Conjunctivae are neither pale nor injected. Sclera are anicteric.  Nasal cannula present. Oropharynx is moist without lesions.  No significant erythema at the gums in the mouth.  Neck: Supple. No JVD (jugular venous distention), bruit, or thyromegaly. Trachea is midline.  Lungs: Clear to auscultation bilaterally without crackles or wheezes. There is normal effort. There is fairly good aeration.  Heart:  Irregularly irregular and rate controlled without murmurs, rubs, or gallops.  Dorsalis pedis and posterior tibial pulses are palpable bilaterally.  Abdomen: Soft, nontender, and nondistended. There are no masses or hepatosplenomegaly. No rebound or guarding.  Bowel sounds normal.     Extremities: No cyanosis, clubbing, or edema.  Charcot foot changes noted right foot.  Skin: Warm and dry chronic changes.  Just below the left knee is an abraded area with surrounding erythema.  Multiple other abraded areas on the arms and legs.  Right 2nd toe is erythematous and swollen.  Abraded area is noted.  The erythema does extend mildly onto the dorsum of the foot good no tenderness to palpation.  No purulent drainage able to be expressed..  Neurologic: Alert and oriented x3. Follows commands and answers questions appropriately.  Moving upper and lower extremities symmetrically.  Decreased sensation in the feet bilaterally.      TEST RESULTS     Labs: All lab values from the last 24 hours have been reviewed by me as well as pertinent older lab values including culture results.    Laboratory values:   Recent Labs   Lab 08/20/23  1352 08/16/23  1210   WBC 17.4* 13.3*   HGB 9.6* 10.2*   HCT 29.4* 31.0*    421         Recent Labs   Lab 08/20/23  1352 08/16/23  1210   SODIUM 135 138   POTASSIUM 4.1 4.4   CHLORIDE 98 101   CO2 26 31   CALCIUM 8.4 8.9   GLUCOSE 246* 221*   BUN 21* 26*   CREATININE 0.78 0.81        Recent Labs   Lab 08/20/23  1352 08/16/23  1210   ALBUMIN 2.8* 2.9*   AST 22 18   GPT 15 16   BILIRUBIN 0.4 0.4     Recent Labs   Lab 08/20/23  1352   PCT <0.05   LACTA 5.8*   CRP 3.1*     No results available in last 24 hours    @covidlabs@  No results found    Lab Results   Component Value Date    VB12 227 08/16/2023    MERRITT 21.1 08/16/2023    TSH 1.436 02/01/2023    HGBA1C 4.7 08/16/2023        Lab Results   Component Value Date    CHOLESTEROL 167 11/02/2022    HDL 58 11/02/2022    CALCLDL 88 11/02/2022        Lab Results   Component Value Date    USPG 1.015 08/20/2023    UPROT Negative 08/20/2023    UWBC Negative 08/20/2023    URBC Negative 08/20/2023    UNITR Negative 08/20/2023    UPH 6.0 08/20/2023    UBACTRA Large (A) 06/26/2022       Recent Labs   Lab 08/20/23  1352   PT 10.7   INR 1.0  to 4:00 positions.    Lymphatic:  There is no axillary, cervical, infraclavicular, or supraclavicular adenopathy bilaterally.  Eyes:  Pupils are round and reactive to light.  Cardiovascular:  Heart rate and rhythm are regular.  Respiratory:  Lungs are clear bilaterally with no crackles or wheezes in any lung field.      Musculoskeletal:  Good strength in all 4 extremities.   There is good range of motion in both shoulders.    Skin:  No new skin lesions or rashes on the skin excluding the breast (see breast exam above).    Assessment:  1. Lump or mass in breast          Plan:  Her imaging studies do not show anything of concern.  I explained to her the significance of a probably benign diagnosis.  She has an advantage in that there has been a palpable lump which can easily be followed.  I do think it would be wise to obtain the ultrasound in 6 months as suggested.  I have encouraged her to do monthly self breast examination and to call if she feels anything concerning.  For now, I will just see her on an as-needed basis.      CPT coding:    Next Appointment:  No Follow-up on file.            EMR Dragon/transcription disclaimer:    Much of this encounter note is an electronic transcription/translocation of spoken language to printed text.  The electronic translation of spoken language may permit erroneous, or at times, nonsensical words or phrases to be inadvertently transcribed.  Although I have reviewed the note from such areas, some may still exist.     PTT 31*         Radiology: Imaging studies have been reviewed  Results for orders placed or performed during the hospital encounter of 08/20/23 (from the past 48 hour(s))   XR CHEST PA OR AP 1 VIEW    Impression    Impression:    No acute cardiopulmonary disease.    Electronically Signed by: Jasmina Curran MD   Signed on: 8/20/2023 2:47 PM   Workstation ID: [id number]   XR FOOT 3 OR MORE VIEWS RIGHT    Impression    IMPRESSION:    1. Progressive deformity of the foot. This limits evaluation for  osteomyelitis.      2. No convincing evidence of acute osteomyelitis.  3. Please note osteomyelitis may be present for several days before it  becomes evident on radiographs.          Electronically Signed by: Huy Bolivar MD   Signed on: 8/20/2023 2:57 PM   Workstation ID: [id number]        Encounter Date: 08/20/23   Electrocardiogram 12-Lead   Result Value    Systolic Blood Pressure 133    Diastolic Blood Pressure 83    Ventricular Rate EKG/Min (BPM) 90    Atrial Rate (BPM) 90    CO-Interval (MSEC) 228    QRS-Interval (MSEC) 88    QT-Interval (MSEC) 346    QTc 423    P Axis (Degrees) 105    R Axis (Degrees) 49    T Axis (Degrees) 55    REPORT TEXT      Sinus rhythm  with 1st degree AV block  with  premature atrial complexes  Cannot rule out  Anterior infarct  , age undetermined  Abnormal ECG  When compared with ECG of  26-JUN-2022 23:48,  premature ventricular complexes  are no longer  present  CO interval  has increased  T wave inversion no longer evident in  Inferior leads  QT has shortened          ADVANCED DIRECTIVES     Code Status:  Full resuscitation        Respiratory support    Vent Settings Last Value   O2 3 L/min   Mode     Rate     Tidal Volume     Pressure Support     PEEP/CPAC/EPAP     FiO2     Peak Inspiratory Pressure     Plateau Pressure        No results available in last 24 hours     ASSESSMENT AND PLAN     Severe sepsis and  Leukocytosis and  Lactic acidosis due to   Right diabetic foot  infection and  Possible left anterior shin cellulitis  Labs and imaging as above.  Meets criteria for severe sepsis based upon elevated lactic acid but clinically is hemodynamically stable.  Underlying infection may be left shin cellulitis and or right diabetic foot infection.  Blood cultures pending.  Trend white count and lactic acid.  Chest x-ray and urinalysis negative.  Continue cefepime and vancomycin and add Flagyl.  MRI right foot.  Podiatry consult.  Consider Infectious Disease consult if needed.  Wound care consult.  If areas drain would recommend Wound culture.  Lactic acidosis may also be contributed to by metformin.  Acute hypoxemic respiratory failure   Likely due to acute infection.  Patient is on Eliquis so pulmonary embolism unlikely.  Incentive spirometry.  Supplemental oxygen.  Wean as tolerated.  Additional workup if needed.  Home oxygen evaluation prior to discharge.  Type 2 diabetes mellitus without long-term use of insulin with hyperglycemia  Type 2 diabetes mellitus without long-term use of insulin with hyperglycemia  -most recent HgA1c   Hemoglobin A1C (%)   Date Value   08/16/2023 4.7      -sliding scale insulin  -long-acting insulin if needed  -consistent carbohydrate diet  -home medications -see orders  -one touches q.i.d.  -daily adjustment as needed to optimize glucose control  Macrocytic anemia   Follows with Hematology-Oncology.  Continue to follow.  Additional workup if needed.    Chronic medical problems/resolved hospital problems  Patient Active Problem List   Diagnosis   • Hypertension   • Neuropathy   • Hyperlipidemia   • History of laminectomy   • History of ischemic multifocal multiple vascular territories stroke   • Incomplete paraplegia (CMD)   • Spondylolisthesis of C45 7mm Grade 2   • Diabetic peripheral neuropathy associated with type 2 diabetes mellitus (CMD)   • History of traumatic brain injury   • Carotid atherosclerosis, bilateral, mild 2017   • At risk for abuse of  opiates   • History of alcohol abuse   • History of opioid abuse (CMD)   • Recurrent Prostate cancer S/P ERBT   • Bullous pemphigoid   • Neurogenic bladder   • Iron deficiency   • Chronic Pressure injury of right thigh, stage 3 (CMS/HCC)   • Generalized weakness   • Chronic anemia   • Type 2 diabetes mellitus with diabetic polyneuropathy, without long-term current use of insulin (CMD)   • Does not initiate walking/nonambulatory/wheelchair-bound   • Arthritis   • Chronic pain   • Hx Fracture   • Gastroesophageal reflux disease   • Hx BladerTuberculosis   • Chronic Urinary incontinence   • MRSA nasal colonization   • Choledocholithiasis   • Acute pulmonary embolism without acute cor pulmonale (CMD)   • Atrial fibrillation (CMD)   • Arterial ischemic stroke (CMD)   • Severe sepsis (CMD)     Home medications as appropriate-see orders    DVT Prophylaxis: Eliquis         DISCHARGE PLANNING       Anticipated discharge destination: Home with home health  Expected Discharge Date: 2-3 days    Terry Regalado MD  8/20/2023  3:48 PM

## 2024-01-25 DIAGNOSIS — Z00.00 HEALTH MAINTENANCE EXAMINATION: Primary | ICD-10-CM

## 2024-01-26 LAB
ALBUMIN SERPL-MCNC: 4.3 G/DL (ref 3.5–5.2)
ALBUMIN/GLOB SERPL: 1.8 G/DL
ALP SERPL-CCNC: 70 U/L (ref 39–117)
ALT SERPL-CCNC: 36 U/L (ref 1–33)
APPEARANCE UR: CLEAR
AST SERPL-CCNC: 27 U/L (ref 1–32)
BACTERIA #/AREA URNS HPF: NORMAL /HPF
BASOPHILS # BLD AUTO: 0.06 10*3/MM3 (ref 0–0.2)
BASOPHILS NFR BLD AUTO: 1.1 % (ref 0–1.5)
BILIRUB SERPL-MCNC: 0.5 MG/DL (ref 0–1.2)
BILIRUB UR QL STRIP: NEGATIVE
BUN SERPL-MCNC: 13 MG/DL (ref 6–20)
BUN/CREAT SERPL: 16.7 (ref 7–25)
CALCIUM SERPL-MCNC: 9.5 MG/DL (ref 8.6–10.5)
CASTS URNS MICRO: NORMAL
CHLORIDE SERPL-SCNC: 103 MMOL/L (ref 98–107)
CHOLEST SERPL-MCNC: 260 MG/DL (ref 0–200)
CO2 SERPL-SCNC: 26.3 MMOL/L (ref 22–29)
COLOR UR: YELLOW
CREAT SERPL-MCNC: 0.78 MG/DL (ref 0.57–1)
EGFRCR SERPLBLD CKD-EPI 2021: 98 ML/MIN/1.73
EOSINOPHIL # BLD AUTO: 0.28 10*3/MM3 (ref 0–0.4)
EOSINOPHIL NFR BLD AUTO: 5.3 % (ref 0.3–6.2)
EPI CELLS #/AREA URNS HPF: NORMAL /HPF
ERYTHROCYTE [DISTWIDTH] IN BLOOD BY AUTOMATED COUNT: 11.4 % (ref 12.3–15.4)
GLOBULIN SER CALC-MCNC: 2.4 GM/DL
GLUCOSE SERPL-MCNC: 85 MG/DL (ref 65–99)
GLUCOSE UR QL STRIP: NEGATIVE
HCT VFR BLD AUTO: 40.7 % (ref 34–46.6)
HDLC SERPL-MCNC: 84 MG/DL (ref 40–60)
HGB BLD-MCNC: 13.4 G/DL (ref 12–15.9)
HGB UR QL STRIP: NEGATIVE
IMM GRANULOCYTES # BLD AUTO: 0.02 10*3/MM3 (ref 0–0.05)
IMM GRANULOCYTES NFR BLD AUTO: 0.4 % (ref 0–0.5)
KETONES UR QL STRIP: NEGATIVE
LDLC SERPL CALC-MCNC: 161 MG/DL (ref 0–100)
LEUKOCYTE ESTERASE UR QL STRIP: NEGATIVE
LYMPHOCYTES # BLD AUTO: 2.03 10*3/MM3 (ref 0.7–3.1)
LYMPHOCYTES NFR BLD AUTO: 38.2 % (ref 19.6–45.3)
MCH RBC QN AUTO: 30.2 PG (ref 26.6–33)
MCHC RBC AUTO-ENTMCNC: 32.9 G/DL (ref 31.5–35.7)
MCV RBC AUTO: 91.9 FL (ref 79–97)
MONOCYTES # BLD AUTO: 0.45 10*3/MM3 (ref 0.1–0.9)
MONOCYTES NFR BLD AUTO: 8.5 % (ref 5–12)
NEUTROPHILS # BLD AUTO: 2.47 10*3/MM3 (ref 1.7–7)
NEUTROPHILS NFR BLD AUTO: 46.5 % (ref 42.7–76)
NITRITE UR QL STRIP: NEGATIVE
NRBC BLD AUTO-RTO: 0 /100 WBC (ref 0–0.2)
PH UR STRIP: 7 [PH] (ref 5–8)
PLATELET # BLD AUTO: 319 10*3/MM3 (ref 140–450)
POTASSIUM SERPL-SCNC: 4.5 MMOL/L (ref 3.5–5.2)
PROT SERPL-MCNC: 6.7 G/DL (ref 6–8.5)
PROT UR QL STRIP: NEGATIVE
RBC # BLD AUTO: 4.43 10*6/MM3 (ref 3.77–5.28)
RBC #/AREA URNS HPF: NORMAL /HPF
SODIUM SERPL-SCNC: 140 MMOL/L (ref 136–145)
SP GR UR STRIP: 1.01 (ref 1–1.03)
TRIGL SERPL-MCNC: 88 MG/DL (ref 0–150)
TSH SERPL DL<=0.005 MIU/L-ACNC: 0.83 UIU/ML (ref 0.27–4.2)
UROBILINOGEN UR STRIP-MCNC: NORMAL MG/DL
VLDLC SERPL CALC-MCNC: 15 MG/DL (ref 5–40)
WBC # BLD AUTO: 5.31 10*3/MM3 (ref 3.4–10.8)
WBC #/AREA URNS HPF: NORMAL /HPF

## 2024-02-02 ENCOUNTER — OFFICE VISIT (OUTPATIENT)
Dept: INTERNAL MEDICINE | Facility: CLINIC | Age: 44
End: 2024-02-02
Payer: COMMERCIAL

## 2024-02-02 VITALS
BODY MASS INDEX: 21.83 KG/M2 | SYSTOLIC BLOOD PRESSURE: 114 MMHG | HEIGHT: 65 IN | HEART RATE: 62 BPM | DIASTOLIC BLOOD PRESSURE: 70 MMHG | OXYGEN SATURATION: 98 % | WEIGHT: 131 LBS

## 2024-02-02 DIAGNOSIS — Z00.00 WELL ADULT EXAM: Primary | ICD-10-CM

## 2024-02-02 PROCEDURE — 99396 PREV VISIT EST AGE 40-64: CPT | Performed by: INTERNAL MEDICINE

## 2024-02-02 NOTE — PROGRESS NOTES
Subjective     Salina Arroyo is a 43 y.o. female who presents for a complete physical exam.      History of Present Illness     The following data was reviewed by: Mitra Aguilar MD on 02/02/2024:  Common labs          1/26/2024    10:09   Common Labs   Glucose 85    BUN 13    Creatinine 0.78    Sodium 140    Potassium 4.5    Chloride 103    Calcium 9.5    Total Protein 6.7    Albumin 4.3    Total Bilirubin 0.5    Alkaline Phosphatase 70    AST (SGOT) 27    ALT (SGPT) 36    WBC 5.31    Hemoglobin 13.4    Hematocrit 40.7    Platelets 319    Total Cholesterol 260    Triglycerides 88    HDL Cholesterol 84    LDL Cholesterol  161      HLD.  She follows a healthy diet and exercise regimen.       Review of Systems   Constitutional: Negative.    HENT: Negative.     Eyes: Negative.    Respiratory: Negative.     Cardiovascular: Negative.    Gastrointestinal: Negative.    Endocrine: Negative.    Genitourinary: Negative.    Musculoskeletal: Negative.    Skin: Negative.    Allergic/Immunologic: Negative.    Neurological: Negative.    Hematological: Negative.    Psychiatric/Behavioral: Negative.         The following portions of the patient's history were reviewed and updated as appropriate: allergies, current medications, past family history, past medical history, past social history, past surgical history and problem list.  Health maintenance tab was reviewed and updated with the patient.       Patient Active Problem List    Diagnosis Date Noted    HLD (hyperlipidemia) 05/25/2016       Past Medical History:   Diagnosis Date    Hyperlipidemia        Past Surgical History:   Procedure Laterality Date    CERVICAL BIOPSY  W/ LOOP ELECTRODE EXCISION      FOOT SURGERY Right        Family History   Problem Relation Age of Onset    Stroke Paternal Grandmother     Hyperlipidemia Paternal Grandmother     Stroke Maternal Grandmother     Hyperlipidemia Maternal Grandmother        Social History     Socioeconomic History    Marital  "status:    Tobacco Use    Smoking status: Former     Packs/day: 0.00     Years: 15.00     Additional pack years: 0.00     Total pack years: 0.00     Types: Cigarettes    Smokeless tobacco: Never    Tobacco comments:     Social smoking rarely   Vaping Use    Vaping Use: Never used   Substance and Sexual Activity    Alcohol use: Yes     Alcohol/week: 2.0 standard drinks of alcohol     Types: 2 Glasses of wine per week     Comment: Social    Drug use: Never    Sexual activity: Yes     Partners: Male     Birth control/protection: Pill       Current Outpatient Medications on File Prior to Visit   Medication Sig Dispense Refill    TRI-LO-SPRINTEC 0.18/0.215/0.25 MG-25 MCG per tablet        No current facility-administered medications on file prior to visit.       No Known Allergies    Immunization History   Administered Date(s) Administered    COVID-19 (PFIZER) BIVALENT 12+YRS 11/28/2022    COVID-19 (PFIZER) Purple Cap Monovalent 03/12/2021, 04/02/2021    COVID-19 F23 (MODERNA) 12YRS+ (SPIKEVAX) 12/20/2023    Fluzone (or Fluarix & Flulaval for VFC) >6mos 11/28/2022    Hepatitis A 07/02/2018    Influenza Injectable Mdck Pf Quad 11/19/2018    Influenza TIV (IM) 12/08/2014    Influenza, Unspecified 11/19/2018, 11/28/2022       Objective     /70   Pulse 62   Ht 165.1 cm (65\")   Wt 59.4 kg (131 lb)   SpO2 98%   BMI 21.80 kg/m²     Physical Exam  Constitutional:       Appearance: She is well-developed.   HENT:      Head: Normocephalic and atraumatic.      Right Ear: Hearing, tympanic membrane and external ear normal.      Left Ear: Hearing, tympanic membrane and external ear normal.      Nose: Nose normal.   Neck:      Thyroid: No thyromegaly.   Cardiovascular:      Rate and Rhythm: Normal rate and regular rhythm.      Heart sounds: Normal heart sounds. No murmur heard.  Pulmonary:      Effort: Pulmonary effort is normal.      Breath sounds: Normal breath sounds.   Chest:   Breasts:     Right: Normal.      " Left: Normal.   Abdominal:      General: There is no distension.      Palpations: Abdomen is soft.      Tenderness: There is no abdominal tenderness.   Musculoskeletal:      Cervical back: Neck supple.   Lymphadenopathy:      Cervical: No cervical adenopathy.   Skin:     General: Skin is warm and dry.   Neurological:      Mental Status: She is alert and oriented to person, place, and time.   Psychiatric:         Speech: Speech normal.         Behavior: Behavior normal.         Thought Content: Thought content normal.         Judgment: Judgment normal.         Assessment & Plan   Diagnoses and all orders for this visit:    1. Well adult exam (Primary)        Discussion    Patient presents today for a CPE.      Patient follows a healthy diet.   Patient follows an adequate exercise regimen. Mammogram is up to date.   Colonoscopy is not yet indicated.   Pap smears are performed by the patient's gynecologist.   Discussed importance of preventative care including vaccinations, age appropriate cancer screening, routine lab work, healthy diet, and active lifestyle.  I have recommended that the patient get the following immunizations:  tdap and flu.      Health Maintenance   Topic Date Due    TDAP/TD VACCINES (1 - Tdap) Never done    PAP SMEAR  Never done    INFLUENZA VACCINE  08/01/2023    ANNUAL PHYSICAL  01/13/2024    LIPID PANEL  01/26/2025    HEPATITIS C SCREENING  Completed    COVID-19 Vaccine  Completed    Pneumococcal Vaccine 0-64  Aged Out            No future appointments.

## 2024-03-06 ENCOUNTER — E-VISIT (OUTPATIENT)
Dept: FAMILY MEDICINE CLINIC | Facility: TELEHEALTH | Age: 44
End: 2024-03-06
Payer: COMMERCIAL

## 2024-03-06 PROCEDURE — BRIGHTMDVISIT: Performed by: NURSE PRACTITIONER

## 2024-03-06 RX ORDER — PREDNISONE 20 MG/1
20 TABLET ORAL 2 TIMES DAILY
Qty: 10 TABLET | Refills: 0 | Status: SHIPPED | OUTPATIENT
Start: 2024-03-06 | End: 2024-03-11

## 2024-03-07 NOTE — E-VISIT TREATED
Chief Complaint: Cold, flu, COVID, sinus, hay fever, or seasonal allergies   Patient introduction   Patient is 44-year-old female with congestion, nasal discharge, and sore throat that started less than 48 hours ago.   COVID-19 testing history, vaccination status, and exposure:    Patient was tested for COVID-19 within the last 24 hours. Test result was negative.    Vaccinated with the updated [id number] COVID-19 vaccine (Pfizer-BioNTech or Moderna vaccine after September 12, 2023; or Novavax vaccine after October 3, 2023). Received their most recent dose of the vaccine more than 14 days ago.    No known exposure to a person with a confirmed or suspected case of COVID-19.    No high-risk (household) exposure to COVID-19 within the last 14 days.   General presentation   Symptoms came on gradually.   Fever:    No fever.   Sinus and nasal symptoms:    Nasal discharge.    Clear nasal drainage.    Nasal drainage is thin.    Postnasal drip.    1 to 3 episodes of antibiotic treatment for sinus infection in the last year.    Sinus pain or pressure on or around the nose.    Patient first noticed sinus pain less than 5 days ago.    Sinus pain is not worse with Valsalva.    No itchy nose or sneezing.    No history of unhealed nasal septal ulcer/nasal wound.    No history of deviated septum or nasal polyps.   Throat symptoms:    Sore throat. Has redness on the tonsils. No redness in the back of the throat.    Has pain when swallowing but can swallow liquids and solid foods.   Head and body aches:    No headache.    No sweats.    No chills.    No myalgia.    No fatigue.   Cough:    No cough.   Wheezing and shortness of breath:    No COPD diagnosis.    No asthma diagnosis.    No wheezing.    No shortness of breath.   Chest pain:    No chest pain.   Ear symptoms:    Current symptoms include fullness in the ear(s).   Dizziness:    No dizziness.   Allergies:    No history of allergies.   Flu exposure:    No recent known exposure  "to a person with a confirmed flu diagnosis.    Received a flu vaccine in the last 2 to 4 weeks.   Not taking any over-the-counter medications for current symptoms.   Review of red flags/alarm symptoms:    No changes in alertness or awareness.    No symptoms suggesting airway obstruction.    No decreased urination.    No blue or gray coloring present in face, lips, or nail beds.    No swelling, pain, redness, or increased warmth in the calf or lower part of ONE leg only.    No proptosis.   Risk factors for antibiotic resistance:    Close contact with a child in .   Pregnancy/menstrual status/breastfeeding:    Not pregnant.    Not breastfeeding.    Regarding date of last menstrual period, patient writes: Feb 19th 2024 .   Self-exam:    Height: 5' 5\"    Weight: 132 lbs    Tonsillar edema.    Tonsils appear normal.    No palatal petechiae.    Swollen/painful neck lymph nodes.   Recent antibiotic use:    Has not taken antibiotics for similar symptoms within the past month.   Current medications   Currently taking Tri-Lo-Sprintec 0.18/0.215/0.25 MG-25 MCG per tablet and [facility].   Medication allergies   None.   Medication contraindication review   No history of anaphylactic reaction to beta-lactam antibiotics; aspirin triad; blood dyscrasia; bone marrow depression; catecholamine-releasing paraganglioma; coronary artery disease; coagulation disorder; congenital long QT syndrome; depression; electrolyte abnormalities; fungal infection; GI bleeding; GI obstruction; G6PD deficiency; heart arrhythmia; hypertension; mononucleosis; myasthenia; recent myocardial infarction; NSAID-induced asthma/urticaria; Parkinson's disease; pheochromocytoma; porphyria; Reye syndrome; seizure disorder; ulcerative colitis; and urinary retention.   No history of metoclopramide-associated dystonic reaction and tardive dyskinesia.   No known history of amoxicillin-clavulanate-associated cholestatic jaundice or hepatic " impairment.   No known history of azithromycin-associated cholestatic jaundice or hepatic impairment.   Past medical history   Immune conditions:   No immunocompromising conditions.   No history of cancer.   Social history   Never smoked tobacco.   Patient-submitted comments   Patient was asked if they had anything to add about their symptoms. Patient writes: throat is very sore- i am an  with major event coming up so i need to get whatever this is, gone :) .   Patient did not request an excuse note.   Assessment   Acute nasopharyngitis (common cold).   This is the likely diagnosis based on patient's interview responses, including:    Symptom profile    Gradual onset of symptoms   Plan   Medications:    ibuprofen 200 mg tablet OTC 200mg 2 tabs PO q8h PRN 10d for any fever, pain, or discomfort associated with your condition. Do not exceed 3200mg in a 24-hour period. Amount is 60 tab.   No prescriptions were ordered to treat this patient. The patient selected the following pharmacy during their interview, but no prescriptions were sent:   [id number]   [address] [id number]   Phone: [phone]     Fax: [phone]   Patient informed to purchase OTC medication.   Education:    Condition and causes    Prevention    Treatment and self-care    When to call provider   Additional Notes   prednisone RX   ----------   Electronically signed by SHAKEEL Purcell on 2024-03-06 at 22:54PM   ----------   Patient Interview Transcript:   Which of these symptoms are bothering you? Select all that apply.    Stuffed-up nose or sinuses    Runny nose    Sore throat   Not selected:    Cough    Shortness of breath    Itchy or watery eyes    Itchy nose or sneezing    Loss of smell or taste    Hoarse voice or loss of voice    Headache    Fever    Sweats    Chills    Muscle or body aches    Fatigue or tiredness    Nausea or vomiting    Diarrhea    I don't have any of these symptoms   When  "did your current symptoms start? Select one.    Less than 48 hours ago   Not selected:    3 to 5 days ago    6 to 9 days ago    10 to 14 days ago    2 to 3 weeks ago    3 to 4 weeks ago    More than a month ago   Did your symptoms come on suddenly or gradually? Select one.    Gradually   Not selected:    Suddenly    I'm not sure   Since your current symptoms started, have you been tested for COVID-19? This includes home self-tests as well as nose swab or saliva tests done at a doctor's office, lab, or testing site. Select one.    Yes   Not selected:    No   When was your most recent COVID-19 test? Select one.    Within the last 24 hours   Not selected:    Within the last week (specify date as MM/DD/YY)    7 to 14 days ago    15 to 30 days ago    More than 1 month ago   What was the result of your most recent COVID-19 test? Select one.    Negative   Not selected:    Positive   Has anyone in your household tested positive for COVID-19 in the past 14 days? Select one.    No   Not selected:    Yes   In the last 14 days, have you had close contact with someone who has COVID-19? \"Close contact\" means any of these: - Caring for someone with COVID-19. - Being within 6 feet of someone with COVID-19 for a total of at least 15 minutes over a 24-hour period. For example, three 5-minute exposures for a total of 15 minutes. - Being in direct contact with respiratory droplets from someone with COVID-19 (being coughed on, kissing, sharing utensils). Select one.    No, not that I know of   Not selected:    Yes, a confirmed case    Yes, a suspected case   Have you gotten the [id number] updated COVID-19 vaccine? This means either the updated Pfizer-BioNTech or Moderna vaccine after September 12, 2023; or the updated Novavax vaccine after October 3, 2023. Select one.    Yes   Not selected:    No   When did you get the updated COVID-19 vaccine? Select one.    More than 14 days ago   Not selected:    Less than 48 hours (2 days) ago    48 " "to 72 hours (3 days) ago    3 to 5 days ago    5 to 7 days ago    7 to 14 days ago   Since your symptoms started, have you felt dizzy? Select one.    No   Not selected:    Yes, but I can still do my regular daily activities    Yes, and it makes it hard to stand, walk, or do daily activities   Do you have chest pain? You might also feel it as discomfort, aching, tightness, or squeezing in the chest. Select one.    No   Not selected:    Yes   Do you feel sinus pain or pressure in any of these areas?    Behind my nose   Not selected:    In my forehead    Around my eyes    In my cheeks    In my upper teeth or jaw    No   When did you first notice your sinus pain or pressure? Select one.    Less than 5 days ago   Not selected:    5 to 9 days ago    10 to 14 days ago    2 to 4 weeks ago    1 month ago or longer   Does coughing, sneezing, or leaning forward make your sinuses feel worse? Select one.    No   Not selected:    Yes   What color is your nasal drainage? Select one.    Clear   Not selected:    White    Yellow or yellowish    Green or greenish    My nose is stuffed but not draining or running   Is your nasal drainage thick or thin? Select one.    Thin   Not selected:    Thick   Is there any drainage (mucus) going down the back of your throat? This kind of drainage is also called \"postnasal drip.\" It can cause frequent throat clearing. Select one.    Yes   Not selected:    No, not that I know of   Can you swallow liquids and solid foods? A sore throat may be painful when swallowing, but it shouldn't prevent you from swallowing. Select one.    Yes, but it's painful   Not selected:    Yes, with ease    Yes, but it's uncomfortable    It's hard to swallow anything because it feels like liquids and food get stuck in my throat    No, I can't swallow anything, liquid or solid foods   Is your throat pain worse on one side than the other? Select one.    No, it's the same on both sides   Not selected:    Yes, it's worse on " "the right side    Yes, it's worse on the left side   To recommend the best treatment, we need to see photos of your throat. You can have someone else take the photo, or use a mirror. If you choose not to send photos, you can still continue this interview, but your treatment options may be affected. Select one.    I'll take the photos myself   Not selected:    Someone can take the photos for me    I'd rather not send photos   Send at least 2 photos for review. - Switch the flash to On (not auto). - Stand close to a mirror. - Don't use the \"selfie\" camera. Instead, turn the phone around and tilt it slightly up. - Looking in the mirror, tap to focus on the back of the throat, not the teeth. - Say \"Ah\" so the back of your throat is visible, and take the photo. - Before sending, make sure the photos are clear and the throat is in focus.    Upload 1    Upload 2   Not selected:    Upload 3   Have you urinated at least 3 times in the last 24 hours? Select one.    Yes   Not selected:    No   Do your face, lips, or nail beds appear blue or gray? Select one.    No   Not selected:    Yes   Do you have any swelling, pain, redness, or increased warmth in the calf or lower part of ONE leg only? Select one.    No   Not selected:    Yes   Changes in alertness or awareness may mean you need emergency care. Since your symptoms started, have you had any of these? Select all that apply.    None of the above   Not selected:    Confusion    Slurred speech    Not knowing where you are or what day it is    Difficulty staying conscious    Fainting or passing out   Do your symptoms include a whistling sound, or wheezing, when you breathe? Select one.    No   Not selected:    Yes   Since your symptoms started, have you noticed that one or both of your eyes is bulging or poking out? Select one.    No   Not selected:    Yes   Do you have any of these symptoms in your ear(s)? Select all that apply.    Fullness   Not selected:    Pain   "  Pressure    Crackling or popping    Plugged or blocked sensation    None of the above   Are your tonsils larger than usual?    Yes   Not selected:    No, not that I can tell    I've had my tonsils removed   Is there redness in the back of your throat or on your tonsils? Select all that apply.    Yes, on the tonsils   Not selected:    Yes, in the back of the throat    No, not that I can see   Is there any white or yellow pus on your tonsils?    No, not that I can see   Not selected:    Yes   Are there red spots on the roof of your mouth or the back of your throat?    No, not that I can see   Not selected:    Yes   Are your glands/lymph nodes swollen, or does it hurt when you touch them?    Yes   Not selected:    No, not that I can tell   In the past week, has anyone around you (such as at school, work, or home) had a confirmed diagnosis of the flu? A confirmed diagnosis means that a nose swab was done to verify a flu infection. Select all that apply.    No, not that I know of   Not selected:    I live with someone who has the flu    I've been within touching distance of someone who has the flu    I've walked by, or sat about 3 feet away from, someone who has the flu    I've been in the same building as someone who has the flu   Have you ever been diagnosed with asthma? Select one.    No   Not selected:    Yes   Have you ever been diagnosed with chronic obstructive pulmonary disease (COPD)? Select one.    No, not that I know of   Not selected:    Yes   In the past month, have you taken antibiotics for similar symptoms? Examples of antibiotics include amoxicillin, amoxicillin-clavulanate (Augmentin), penicillin, cefdinir (Omnicef), doxycycline, and clindamycin (Cleocin). Select one.    No   Not selected:    Yes (specify)   In the last year, how many times were you treated with antibiotics for a sinus infection? Select one.    1 to 3 times   Not selected:    None    4 or more times   Do any of these apply to you?  Select all that apply.    I'm in close contact with a child in    Not selected:    I've been hospitalized within the last 5 days    I have diabetes    None of the above   Have you been diagnosed with a deviated septum or nasal polyps? The nose is divided into two nostrils by the septum. A crooked septum is called a deviated septum. Nasal polyps are growths inside the nose or sinuses. Select one.    No, not that I know of   Not selected:    Yes, but I had surgery to treat them    Yes, I have a deviated septum    Yes, I have nasal polyps    Yes, I have a deviated septum and nasal polyps   Do you have a sore inside your nose that won't heal? Select one.    No, not that I know of   Not selected:    Yes   Do you have allergies (pollen, dust mites, mold, animal dander)? Select one.    No, not that I know of   Not selected:    Yes   Have you had a flu shot this season? Select one.    Yes, 2 to 4 weeks ago   Not selected:    Yes, less than 2 weeks ago    Yes, 1 to 3 months ago    Yes, 3 to 6 months ago    Yes, more than 6 months ago    No   Are you pregnant? Select one.    No   Not selected:    Yes   When was your last menstrual period? If you don't currently have periods or no longer have periods, please briefly explain.    __2024 __   Within the last 2 weeks, have you: - Given birth - Had a miscarriage - Had a pregnancy loss - Had an  Being postpartum (live birth or loss) within the last 2 weeks increases your risk of flu complications. Select one.    No   Not selected:    Yes   Are you breastfeeding? Select one.    No   Not selected:    Yes   The flu and COVID-19 can be more serious for people in certain groups. The next few questions help us figure out if you or anyone you live with is at higher risk for complications from these infections. Do any of these statements apply to you? Select all that apply.    None of the above   Not selected:    I'm     I'm     I'm  Black    I'm  or    Do you smoke tobacco? Select one.    No   Not selected:    Yes, every day    Yes, some days    No, I quit   Do you have a mostly inactive lifestyle? Answer yes if all of these are true: - You spend at least 6 hours a day sitting or lying down - You get less than 2 and a half hours per week of moderate exercise such as walking fast - You get less than 1 hour and 15 minutes per week of intense exercise such as jogging or running Select one.    No   Not selected:    Yes   Do you have any of these conditions? Select all that apply.    None of the above   Not selected:    Chronic lung disease, such as cystic fibrosis or interstitial fibrosis    Heart disease, such as congenital heart disease, congestive heart failure, or coronary artery disease    Disorder of the brain, spinal cord, or nerves and muscles, such as dementia, cerebral palsy, epilepsy, muscular dystrophy, or developmental delay    Metabolic disorder or mitochondrial disease    Cerebrovascular disease, such as stroke or another condition affecting the blood vessels or blood supply to the brain    Down syndrome    Mood disorder, including depression or schizophrenia spectrum disorders    Substance use disorder, such as alcohol, opioid, or cocaine use disorder    Tuberculosis    Primary immunodeficiency   Do you live in a group care setting? Examples include: - Nursing home - Residential care - Psychiatric treatment facility - [facility] Board and care [facility] - Homeless shelter - Foster care setting Select one.    No   Not selected:    Yes   Are you a healthcare worker? Select one.    No   Not selected:    Yes   People with a very high body mass index (BMI) are at higher risk for developing complications from the flu and severe illness from COVID-19. To determine your BMI, we need to know your weight and height. Please enter your weight (in pounds).    Weight   Please enter your height.    Height   Do you have any  of these conditions that can affect the immune system? Scroll to see all options. Select all that apply.    None of these   Not selected:    History of bone marrow transplant    Chronic kidney disease    Chronic liver disease (including cirrhosis)    HIV/AIDS    Inflammatory bowel disease (Crohn's disease or ulcerative colitis)    Lupus    Moderate to severe plaque psoriasis    Multiple sclerosis    Rheumatoid arthritis    Sickle cell anemia    Alpha or beta thalassemia    History of kidney, liver, heart, or other solid organ transplant    History of liver, heart, or other solid organ transplant    History of spleen removal    An autoimmune disorder not listed here (specify)    A condition requiring treatment with long-term use of oral steroids (such as prednisone, prednisolone, or dexamethasone) (specify)   Have you ever been diagnosed with cancer? Select one.    No   Not selected:    Yes, I have cancer now    Yes, but I'm in remission   The flu and COVID-19 can be more serious for people in certain groups. Do any of these apply to the people who live with you? Select all that apply.    None of the above   Not selected:    Under age 5    Over age 65            Black     or     Pregnant    Has given birth, had a miscarriage, had a pregnancy loss, or had an  in the last 2 weeks   Does any member of your household have any of these medical conditions? Select all that apply.    None of the above   Not selected:    Asthma    Disorders of the brain, spinal cord, or nerves and muscles, such as dementia, cerebral palsy, epilepsy, muscular dystrophy, or developmental delay    Chronic lung disease, such as COPD or cystic fibrosis    Heart disease, such as congenital heart disease, congestive heart failure, or coronary artery disease    Cerebrovascular disease, such as stroke or another condition affecting the blood vessels or blood supply to the brain    Blood disorders, such  as sickle cell disease    Diabetes    Metabolic disorders such as inherited metabolic disorders or mitochondrial disease    Kidney disorders    Liver disorders    Weakened immune system due to illness or medications such as chemotherapy or steroids    Children under the age of 19 who are on long-term aspirin therapy    Extreme obesity (BMI > 40)   Do you have any of these conditions? Scroll to see all options. Select all that apply.    None of the above   Not selected:    Aspirin triad (also known as Samter's triad or ASA triad)    Asthma or hives from taking aspirin or other NSAIDs, such as ibuprofen or naproxen    Blockage or narrowing of the blood vessels of the heart    Blood clotting disorder    Blood dyscrasia, such anemia, leukemia, lymphoma, or myeloma    Bone marrow depression    Catecholamine-releasing paraganglioma    Congenital long QT syndrome    Depression    Difficulty urinating or completely emptying your bladder    Uncorrected electrolyte abnormalities    Fungal infection    Gastrointestinal (GI) bleeding    Gastrointestinal (GI) obstruction    G6PD deficiency    Recent heart attack    High blood pressure    Irregular heartbeat or heart rhythm    Mononucleosis (mono)    Myasthenia gravis    Parkinson's disease    Pheochromocytoma    Reye syndrome    Seizure disorder    Thyroid disease    Ulcerative colitis   Have you ever had either of these conditions? Select all that apply.    No   Not selected:    Metoclopramide-associated dystonic reaction    Tardive dyskinesia   Just a few more questions about medications, and then you're finished. Have you used any non-prescription medications or nasal sprays for your current symptoms? Examples include saline sprays, decongestants, NyQuil, and Tylenol. Select one.    No   Not selected:    Yes   Have you taken any monoamine oxidase inhibitor (MAOI) medications in the last 14 days? Examples include rasagiline (Azilect), selegiline (Eldepryl, Zelapar),  isocarboxazid (Marplan), phenelzine (Nardil), and tranylcypromine (Parnate). Select one.    No, not that I know of   Not selected:    Yes   Do you take Kynmobi or Apokyn (apomorphine)? Select one.    No   Not selected:    Yes   Are you still taking these medications listed in your medical record? If you're not taking any of these, click Next. Select all that apply.    Tri-Lo-Sprintec 0.18/0.215/0.25 MG-25 MCG per tablet   Are you taking any other medications, vitamins, or supplements? Select one.    Yes   Not selected:    No   Have you ever had an allergic or bad reaction to any medication? Select one.    No   Not selected:    Yes   Are you allergic to milk or to the proteins found in milk (for example, whey or casein)? A milk allergy is different from lactose intolerance. Select one.    No, not that I know of   Not selected:    Yes   Have you ever had jaundice or liver problems as a result of taking amoxicillin-clavulanate (Augmentin)? Jaundice is a condition in which the skin and the whites of the eyes turn yellow. Select all that apply.    No, not that I know of   Not selected:    Yes, jaundice    Yes, liver problems   Have you ever had jaundice or liver problems as a result of taking azithromycin (Zithromax, Zmax)? Jaundice is a condition in which the skin and the whites of the eyes turn yellow. Select all that apply.    No, not that I know of   Not selected:    Yes, jaundice    Yes, liver problems   Do you need a doctor's note? A doctor's note confirms that you received care today and states when you can return to school or work. It does not contain information about your diagnosis or treatment plan. Your provider will make the final decision on whether to give you a doctor's note and for how long. Doctor's notes CANNOT be backdated. We can't provide medical leave paperwork through this type of visit. If more paperwork is needed to request time off, contact your primary care provider. Select one.    No   Not  selected:    Today only (1 day)    Today and tomorrow (2 days)    3 days    5 days    7 days   Is there anything you'd like to add about your symptoms? Please limit your comments to the symptoms covered in this interview. If you include comments about other concerns, your provider may recommend that you be seen in person.    __throat is very sore- i am an  with major event coming up so i need to get whatever this is, gone :) __   ----------   Medical history   Medical history data does not currently exist for this patient.

## 2024-03-07 NOTE — EXTERNAL PATIENT INSTRUCTIONS
Note   rest fluids prednisone sent to pharmacy PCP if symptoms persist ER for worsening symptoms such as high fever, chest pain or SOA   Diagnosis   Common cold   My name is SHAKEEL Purcell, and I'm a healthcare provider at [facility]. I reviewed your interview, and I see that you may have a cold.   To prevent the spread of illness to others, stay home and away from other people as much as possible while you're sick. When you need to be around other people, consider wearing a face mask.   Here are the main things to know about your current symptoms:    Colds get better on their own.    You can use the medications recommended here to help ease your symptoms.   Based on what you told me in your interview, I haven't prescribed antibiotics. Antibiotics fight bacteria, not viruses. They don't help when you have a viral infection like the common cold. Antibiotics could even make you feel worse as they can cause or worsen nausea, diarrhea, and stomach pain. The following factors suggest that a virus is causing your symptoms:    You've had symptoms for less than 10 days. A bacterial infection is suspected when you've had symptoms longer than 10 days without improvement.    You haven't had a fever. Bacterial infections can cause a high fever.    Your nasal discharge is thin.    Your nasal discharge is clear or white.    Your glands/lymph nodes are swollen or tender to the touch. Swollen lymph nodes are common with viral conditions like yours.    In addition to your sore throat, you have other cold symptoms like a stuffy nose or cough. This suggests a viral infection, rather than a bacterial infection such as strep throat.   Medications   Non-prescription   Ibuprofen (200mg): Take 2 tablets by mouth every 8 hours as needed for up to 10 days for any fever, pain, or discomfort associated with your condition. Do not exceed 3200mg in a 24-hour period.   About your diagnosis   The common cold is a viral infection of the  respiratory tract, which includes the nose, throat, breathing passages, and lungs. These are the key things to know about colds:    There is no vaccine to prevent colds and no cure for a cold.    Some medications can lessen your symptoms.    You can spread a cold to other people.    Over 200 different viruses can cause the common cold. That's why you can get another cold after you've just had one.   Common symptoms include low-grade fever, sneezing, runny nose, congestion, sore throat, and cough. You may also notice fatigue, body aches, difficulty sleeping, or decreased appetite.   What to expect   You should feel better within 5 to 7 days and be back to normal within 14 days.   When to seek care   Call us at [phone]   with any sudden or unexpected symptoms.    Fever that measures over 103F or continues for more than 3 days.    A worsening headache.    Swallowing becomes extremely difficult or impossible.    Hoarse voice or loss of voice lasting longer than 2 weeks.    Your sinus pain continues for 10 days or more, without improvement.    More than 5 episodes of diarrhea in a day.    More than 5 episodes of vomiting in a day.    Coughing up red or bloody mucus.    Severe shortness of breath.    Severe stomach pain.    Symptoms that get better for a few days, and then suddenly get worse.    Severe chest pain   Other treatment    Rest! Your body needs rest to recover and fight the virus.    Drink plenty of water to stay hydrated.    Use a clean humidifier or a cool-mist vaporizer in your room at night. Breathing humid air may help with nasal congestion and cough.    Try non-prescription saline nasal sprays to help your nasal symptoms.    Try using a Neti Pot to flush out your stuffy nose and sinuses. Neti Pots are available at any drugstore without a prescription.    Gargle with salt water several times a day to help your throat feel better. Cough drops and throat lozenges may provide extra relief. A teaspoon  of honey stirred into warm water or weak tea can help soothe a sore throat and cough.    Avoid smoke and air pollution. Smoke can make infections worse.   Prevention    Avoid close contact with other people when you're sick.    Cover your mouth and nose when you cough or sneeze. Use a tissue or cough into your elbow. Make sure that used tissues go directly into the trash.    Avoid touching your eyes, nose, or mouth while you're sick.    Wash your hands often, especially after coughing, sneezing, or blowing your nose. If soap and water are not available, use an alcohol-based hand .    If you or someone in your home or workplace is sick, disinfect commonly used items. This includes door handles, tables, computers, remotes, and pens.    Coronavirus (COVID-19) information   Common symptoms of COVID-19 include fever, cough, shortness of breath, fatigue, muscle or body aches, headaches, new loss of sense of taste or smell, sore throat, stuffy or runny nose, nausea or vomiting, and diarrhea. Most people who get COVID-19 have mild symptoms and can rest at home until they get better. Elderly people and those with chronic medical problems may be at risk for more serious complications.   FAQs about the COVID-19 vaccine   Are the vaccines safe?   Yes. Hundreds of millions of people in the [place] have already safely received COVID-19 vaccines under the most intense safety monitoring in the history of the [place].   Do I need the vaccine if I've already had COVID?   Yes. Vaccination helps protect you even if you've already had COVID.   If you had COVID-19 and had symptoms, wait to get vaccinated until you've recovered and completed your isolation period.   If you tested positive for COVID-19 but did not have symptoms, you can get vaccinated after 5 full days have passed since you had a positive test, as long as you don't develop symptoms.   How many doses of the vaccine do I need?   Visit  www.cdc.gov/coronavirus/2019-ncov/vaccines/stay-up-to-date.html   to find out how to stay up to date with your COVID-19 vaccines.   I'm immunocompromised. How many doses of the vaccine do I need?   For information on how immunocompromised people can stay up to date with their COVID-19 vaccines, visit www.cdc.gov/coronavirus/2019-ncov/vaccines/recommendations/immuno.html  .   What are the common side effects of the vaccine?   A sore arm, tiredness, headache, and muscle pain may occur within two days of getting the vaccine and last a day or two. For the Moderna or Pfizer vaccines, side effects are more common after the second dose. People over the age of 55 are less likely to have side effects than younger people.   After I'm up to date on vaccines, can I still get or spread COVID?   Yes, you can still get COVID, but your disease should be milder. And your risk of serious illness, hospitalization, and complications will be much lower, especially if you're up to date. Unfortunately, you can still spread COVID if you've been vaccinated. That's why it's important to follow isolation guidelines if you get sick or test positive.   After I'm up to date on vaccines, can I go back to normal?   You should still wear a mask indoors in public if:    It's required by laws, rules, regulations, or local guidance.    You have a weakened immune system.    Your age puts you at increased risk of severe disease.    You have a medical condition that puts you at increased risk of severe disease.    Someone in your household has a weakened immune system, is at increased risk for severe disease, or is unvaccinated.    You're in an area of high transmission.   Where can I get a COVID-19 vaccine?   Visit [facility]'s website for more information. To find a COVID-19 vaccination site near you, visit www.vaccines.gov/  , call [phone]  , or text your zip code to 768678 ([email or web address]). Message and data rates may apply.   I've had close  contact with someone who has COVID. Do I need to quarantine, and if so, for how long?   For the most current answer, including a calculator to determine whether you need to stay home and for how long, visit www.cdc.gov/coronavirus/2019-ncov/your-health/isolation.html  .   I've tested positive for COVID. How long do I need to isolate?   For the latest recommendations, including a calculator to determine how long you need to stay home, visit www.cdc.gov/coronavirus/2019-ncov/your-health/isolation.html  .   What if I develop symptoms that might be from COVID?   For the latest recommendations on what to do if you're sick, including when to seek emergency care, visit www.cdc.gov/coronavirus/2019-ncov/if-you-are-sick/index.html  .    Flu vaccine information   Who should get a flu vaccine?   Everyone 6 months of age and older should get a yearly flu vaccine.   When should I get vaccinated?   You should get a flu vaccine by the end of October. Once you're vaccinated, it takes about two weeks for antibodies to develop and protect you against the flu. That's why it's important to get vaccinated as soon as possible.   After October, is it too late to get vaccinated?   No. You should still get vaccinated. As long as the flu viruses are still in your community, flu vaccines will remain available, even into January of next year or later.   Why do I need a flu vaccine EVERY year?   Flu viruses are constantly changing, so flu vaccines are usually updated from one season to the next. Your protection from the flu vaccine also lessens over time.   Is the flu vaccine safe?   Yes. Over the last 50 years, hundreds of millions of Americans have safely received the flu vaccines.   What are the side effects of flu vaccines?   You CANNOT get the flu from a flu vaccine. Common side effects of the flu shot include soreness, redness and/or swelling where the shot was given, low grade fever, and aches. Common side effects of the nasal spray flu  vaccine for adults include runny nose, headaches, sore throat, and cough. For children, side effects include wheezing, vomiting, muscle aches, and fever.   Does the flu vaccine increase your risk of getting COVID-19?   No. There is no evidence that getting a flu vaccine increases your risk of getting COVID-19.   Is it safe to get the flu vaccine along with a COVID-19 vaccine?   Yes. It's safe to get the flu vaccine with a COVID-19 vaccine or booster.   Contact your healthcare provider TODAY for details on when and where to get your flu vaccine.   Your provider   Your diagnosis was provided by SHAKEEL Purcell, a member of your trusted care team at [facility].   If you have any questions, call us at [phone]  .

## 2024-06-14 ENCOUNTER — APPOINTMENT (OUTPATIENT)
Dept: WOMENS IMAGING | Facility: HOSPITAL | Age: 44
End: 2024-06-14
Payer: COMMERCIAL

## 2024-06-14 PROCEDURE — 77063 BREAST TOMOSYNTHESIS BI: CPT | Performed by: RADIOLOGY

## 2024-06-14 PROCEDURE — 77067 SCR MAMMO BI INCL CAD: CPT | Performed by: RADIOLOGY

## 2024-06-24 ENCOUNTER — APPOINTMENT (OUTPATIENT)
Dept: WOMENS IMAGING | Facility: HOSPITAL | Age: 44
End: 2024-06-24
Payer: COMMERCIAL

## 2024-06-24 PROCEDURE — 77061 BREAST TOMOSYNTHESIS UNI: CPT | Performed by: RADIOLOGY

## 2024-06-24 PROCEDURE — 77065 DX MAMMO INCL CAD UNI: CPT | Performed by: RADIOLOGY

## 2024-06-24 PROCEDURE — G0279 TOMOSYNTHESIS, MAMMO: HCPCS | Performed by: RADIOLOGY

## 2024-06-24 PROCEDURE — 76642 ULTRASOUND BREAST LIMITED: CPT | Performed by: RADIOLOGY

## 2024-07-15 ENCOUNTER — APPOINTMENT (OUTPATIENT)
Dept: WOMENS IMAGING | Facility: HOSPITAL | Age: 44
End: 2024-07-15
Payer: COMMERCIAL

## 2024-07-15 ENCOUNTER — LAB REQUISITION (OUTPATIENT)
Dept: LAB | Facility: HOSPITAL | Age: 44
End: 2024-07-15
Payer: COMMERCIAL

## 2024-07-15 DIAGNOSIS — N63.0 UNSPECIFIED LUMP IN UNSPECIFIED BREAST: ICD-10-CM

## 2024-07-15 PROCEDURE — A4648 IMPLANTABLE TISSUE MARKER: HCPCS | Performed by: RADIOLOGY

## 2024-07-15 PROCEDURE — 88305 TISSUE EXAM BY PATHOLOGIST: CPT | Performed by: OBSTETRICS & GYNECOLOGY

## 2024-07-15 PROCEDURE — 19081 BX BREAST 1ST LESION STRTCTC: CPT | Performed by: RADIOLOGY

## 2024-07-17 LAB
DX PRELIMINARY: NORMAL
LAB AP CASE REPORT: NORMAL
LAB AP INTRADEPARTMENTAL CONSULT: NORMAL
PATH REPORT.FINAL DX SPEC: NORMAL
PATH REPORT.GROSS SPEC: NORMAL

## 2024-12-20 ENCOUNTER — OFFICE VISIT (OUTPATIENT)
Dept: INTERNAL MEDICINE | Facility: CLINIC | Age: 44
End: 2024-12-20
Payer: COMMERCIAL

## 2024-12-20 VITALS
OXYGEN SATURATION: 98 % | BODY MASS INDEX: 22.66 KG/M2 | HEART RATE: 80 BPM | DIASTOLIC BLOOD PRESSURE: 72 MMHG | HEIGHT: 65 IN | SYSTOLIC BLOOD PRESSURE: 120 MMHG | WEIGHT: 136 LBS

## 2024-12-20 DIAGNOSIS — U07.1 COVID-19 VIRUS INFECTION: Primary | ICD-10-CM

## 2024-12-20 PROCEDURE — 99213 OFFICE O/P EST LOW 20 MIN: CPT | Performed by: INTERNAL MEDICINE

## 2024-12-20 RX ORDER — DOXYCYCLINE 100 MG/1
100 CAPSULE ORAL 2 TIMES DAILY
Qty: 14 CAPSULE | Refills: 0 | Status: SHIPPED | OUTPATIENT
Start: 2024-12-20

## 2024-12-20 NOTE — PROGRESS NOTES
"Subjective     Salina Arroyo is a 44 y.o. female who presents with   Chief Complaint   Patient presents with    Cough    Nasal Congestion       Cough  Pertinent negatives include no chills or fever.        URI going since the 8th.  Tested positive for COVID earlier this weeks.  She is feeling better but still has cough with production.  Head and nasal congestion.  Purulent drainage.  No sinus pain and pressure.  No fever.      Review of Systems   Constitutional:  Negative for chills and fever.   Respiratory:  Positive for cough.        The following portions of the patient's history were reviewed and updated as appropriate: allergies, current medications and problem list.    Patient Active Problem List    Diagnosis Date Noted    HLD (hyperlipidemia) 05/25/2016       Current Outpatient Medications on File Prior to Visit   Medication Sig Dispense Refill    TRI-LO-SPRINTEC 0.18/0.215/0.25 MG-25 MCG per tablet        No current facility-administered medications on file prior to visit.       Objective     /72   Pulse 80   Ht 165.1 cm (65\")   Wt 61.7 kg (136 lb)   SpO2 98%   BMI 22.63 kg/m²     Physical Exam  Constitutional:       Appearance: She is well-developed.   HENT:      Head: Normocephalic and atraumatic.      Right Ear: Hearing and tympanic membrane normal.      Left Ear: Hearing and tympanic membrane normal.      Mouth/Throat:      Pharynx: No oropharyngeal exudate or posterior oropharyngeal erythema.   Cardiovascular:      Rate and Rhythm: Normal rate and regular rhythm.      Heart sounds: Normal heart sounds.   Pulmonary:      Effort: Pulmonary effort is normal.      Breath sounds: Normal breath sounds.   Skin:     General: Skin is warm and dry.   Neurological:      Mental Status: She is alert and oriented to person, place, and time.   Psychiatric:         Behavior: Behavior normal.         Assessment & Plan   Diagnoses and all orders for this visit:    1. COVID-19 virus infection " (Primary)    Other orders  -     doxycycline (VIBRAMYCIN) 100 MG capsule; Take 1 capsule by mouth 2 (Two) Times a Day.  Dispense: 14 capsule; Refill: 0        Discussion    Patient presents with URI c/w resolving COVID.  I discussed with the patient a trial of conservative management with:   Mucinex D.  Let me know if not feeling better over the next several days or if there is any change in symptoms.  Antibiotic given for her to take IF it progresses into a secondary bacterial infection.           No future appointments.

## 2025-01-17 ENCOUNTER — APPOINTMENT (OUTPATIENT)
Dept: WOMENS IMAGING | Facility: HOSPITAL | Age: 45
End: 2025-01-17
Payer: COMMERCIAL

## 2025-01-17 PROCEDURE — 77065 DX MAMMO INCL CAD UNI: CPT | Performed by: RADIOLOGY

## 2025-01-17 PROCEDURE — G0279 TOMOSYNTHESIS, MAMMO: HCPCS | Performed by: RADIOLOGY

## 2025-01-17 PROCEDURE — 76642 ULTRASOUND BREAST LIMITED: CPT | Performed by: RADIOLOGY

## 2025-01-17 PROCEDURE — 77061 BREAST TOMOSYNTHESIS UNI: CPT | Performed by: RADIOLOGY

## 2025-01-22 ENCOUNTER — OFFICE VISIT (OUTPATIENT)
Dept: INTERNAL MEDICINE | Facility: CLINIC | Age: 45
End: 2025-01-22
Payer: COMMERCIAL

## 2025-01-22 ENCOUNTER — TELEPHONE (OUTPATIENT)
Dept: INTERNAL MEDICINE | Facility: CLINIC | Age: 45
End: 2025-01-22

## 2025-01-22 VITALS
DIASTOLIC BLOOD PRESSURE: 68 MMHG | OXYGEN SATURATION: 98 % | HEIGHT: 65 IN | BODY MASS INDEX: 22.66 KG/M2 | HEART RATE: 82 BPM | WEIGHT: 136 LBS | SYSTOLIC BLOOD PRESSURE: 108 MMHG

## 2025-01-22 DIAGNOSIS — J06.9 UPPER RESPIRATORY TRACT INFECTION, UNSPECIFIED TYPE: Primary | ICD-10-CM

## 2025-01-22 DIAGNOSIS — J02.9 SORE THROAT: ICD-10-CM

## 2025-01-22 LAB
EXPIRATION DATE: NORMAL
EXPIRATION DATE: NORMAL
FLUAV AG UPPER RESP QL IA.RAPID: NOT DETECTED
FLUBV AG UPPER RESP QL IA.RAPID: NOT DETECTED
INTERNAL CONTROL: NORMAL
INTERNAL CONTROL: NORMAL
Lab: NORMAL
Lab: NORMAL
S PYO AG THROAT QL: NEGATIVE
SARS-COV-2 AG UPPER RESP QL IA.RAPID: NOT DETECTED

## 2025-01-22 PROCEDURE — 99213 OFFICE O/P EST LOW 20 MIN: CPT | Performed by: INTERNAL MEDICINE

## 2025-01-22 PROCEDURE — 87428 SARSCOV & INF VIR A&B AG IA: CPT | Performed by: INTERNAL MEDICINE

## 2025-01-22 PROCEDURE — 87880 STREP A ASSAY W/OPTIC: CPT | Performed by: INTERNAL MEDICINE

## 2025-01-22 NOTE — PROGRESS NOTES
"Subjective     Salina Arroyo is a 44 y.o. female who presents with   Chief Complaint   Patient presents with    Cough    Sore Throat       Cough  Associated symptoms include a sore throat. Pertinent negatives include no chills, ear pain, fever, shortness of breath or wheezing.   Sore Throat   Associated symptoms include congestion and coughing. Pertinent negatives include no ear pain or shortness of breath.        C/o URI.  Started three days ago.  Sore throat.  Cough and congestion.  PND.  Clear drainage.      Review of Systems   Constitutional:  Negative for chills, diaphoresis and fever.   HENT:  Positive for congestion and sore throat. Negative for ear pain and sinus pain.    Respiratory:  Positive for cough. Negative for shortness of breath and wheezing.        The following portions of the patient's history were reviewed and updated as appropriate: allergies, current medications and problem list.    Patient Active Problem List    Diagnosis Date Noted    HLD (hyperlipidemia) 05/25/2016       Current Outpatient Medications on File Prior to Visit   Medication Sig Dispense Refill    doxycycline (VIBRAMYCIN) 100 MG capsule Take 1 capsule by mouth 2 (Two) Times a Day. 14 capsule 0    TRI-LO-SPRINTEC 0.18/0.215/0.25 MG-25 MCG per tablet        No current facility-administered medications on file prior to visit.       Objective     /68   Pulse 82   Ht 165.1 cm (65\")   Wt 61.7 kg (136 lb)   SpO2 98%   BMI 22.63 kg/m²     Physical Exam  Constitutional:       Appearance: She is well-developed.   HENT:      Head: Normocephalic and atraumatic.      Right Ear: Hearing and tympanic membrane normal.      Left Ear: Hearing and tympanic membrane normal.      Mouth/Throat:      Pharynx: No oropharyngeal exudate or posterior oropharyngeal erythema.   Cardiovascular:      Rate and Rhythm: Normal rate and regular rhythm.      Heart sounds: Normal heart sounds.   Pulmonary:      Effort: Pulmonary effort is normal.      " Breath sounds: Normal breath sounds.   Skin:     General: Skin is warm and dry.   Neurological:      Mental Status: She is alert and oriented to person, place, and time.   Psychiatric:         Behavior: Behavior normal.         Assessment & Plan   Diagnoses and all orders for this visit:    1. Upper respiratory tract infection, unspecified type (Primary)        Discussion    Patient presents with a URI likely of viral etiology.  She is negative for COVID, flu and strep in the office today.  I discussed with the patient a trial of conservative management with:   rest, Mucinex D, and fluids .  Let me know if not feeling better over the next several days or if there is any change in symptoms.         No future appointments.      Answers submitted by the patient for this visit:  Sore Throat Questionnaire (Submitted on 1/22/2025)  Chief Complaint: Sore throat  drainage: Yes

## 2025-01-22 NOTE — TELEPHONE ENCOUNTER
Caller: Salina Arroyo    Relationship: Self    Best call back number:     [phone]       What medication are you requesting: AMOXICILLIN OR SOMETHING FOR THE ON GOING SORE THROAT    What are your current symptoms:     THROAT IS SORE AND IRRITATED    How long have you been experiencing symptoms: ON GOING FOR 3-4 DAYS    Have you had these symptoms before:    [x] Yes  [] No    Have you been treated for these symptoms before:   [x] Yes  [] No    If a prescription is needed, what is your preferred pharmacy and phone number:      [id number] [place] [address] AT [facility] [address]  [address] [facility] - [phone]  - [phone]     Additional notes:    PATIENT HAS BEEN USING OTC MEDS AND IT IS NOT HELPING.  THIS IS THE SAME SYMPTOMS SHE SAW YOU FOR LAST MONTH.    PATIENT WOULD LIKE A CALL BACK PLEASE.

## 2025-06-17 ENCOUNTER — APPOINTMENT (OUTPATIENT)
Dept: WOMENS IMAGING | Facility: HOSPITAL | Age: 45
End: 2025-06-17
Payer: COMMERCIAL

## 2025-06-17 PROCEDURE — 77063 BREAST TOMOSYNTHESIS BI: CPT | Performed by: RADIOLOGY

## 2025-06-17 PROCEDURE — 77067 SCR MAMMO BI INCL CAD: CPT | Performed by: RADIOLOGY
